# Patient Record
Sex: FEMALE | Race: BLACK OR AFRICAN AMERICAN | NOT HISPANIC OR LATINO | Employment: UNEMPLOYED | URBAN - METROPOLITAN AREA
[De-identification: names, ages, dates, MRNs, and addresses within clinical notes are randomized per-mention and may not be internally consistent; named-entity substitution may affect disease eponyms.]

---

## 2017-06-15 ENCOUNTER — HISTORICAL (OUTPATIENT)
Dept: RADIOLOGY | Facility: HOSPITAL | Age: 37
End: 2017-06-15

## 2019-01-31 ENCOUNTER — HISTORICAL (OUTPATIENT)
Dept: ADMINISTRATIVE | Facility: HOSPITAL | Age: 39
End: 2019-01-31

## 2019-02-21 ENCOUNTER — HISTORICAL (OUTPATIENT)
Dept: ADMINISTRATIVE | Facility: HOSPITAL | Age: 39
End: 2019-02-21

## 2019-09-19 ENCOUNTER — HISTORICAL (OUTPATIENT)
Dept: ADMINISTRATIVE | Facility: HOSPITAL | Age: 39
End: 2019-09-19

## 2019-09-27 ENCOUNTER — HISTORICAL (OUTPATIENT)
Dept: RADIOLOGY | Facility: HOSPITAL | Age: 39
End: 2019-09-27

## 2022-04-07 ENCOUNTER — HISTORICAL (OUTPATIENT)
Dept: ADMINISTRATIVE | Facility: HOSPITAL | Age: 42
End: 2022-04-07
Payer: MEDICAID

## 2022-04-24 VITALS
HEIGHT: 64 IN | WEIGHT: 150 LBS | SYSTOLIC BLOOD PRESSURE: 135 MMHG | BODY MASS INDEX: 25.61 KG/M2 | DIASTOLIC BLOOD PRESSURE: 84 MMHG

## 2022-06-06 ENCOUNTER — HOSPITAL ENCOUNTER (EMERGENCY)
Facility: HOSPITAL | Age: 42
Discharge: HOME | End: 2022-06-06
Attending: INTERNAL MEDICINE
Payer: MEDICAID

## 2022-06-06 VITALS
DIASTOLIC BLOOD PRESSURE: 98 MMHG | OXYGEN SATURATION: 99 % | RESPIRATION RATE: 16 BRPM | HEIGHT: 64 IN | SYSTOLIC BLOOD PRESSURE: 133 MMHG | WEIGHT: 130 LBS | BODY MASS INDEX: 22.2 KG/M2 | TEMPERATURE: 99 F | HEART RATE: 137 BPM

## 2022-06-06 DIAGNOSIS — Z00.8 ENCOUNTER FOR GENERAL PSYCHIATRIC EXAMINATION WITHOUT NEED FOR CARE: ICD-10-CM

## 2022-06-06 DIAGNOSIS — Z71.1 FEARED CONDITION NOT DEMONSTRATED: Primary | ICD-10-CM

## 2022-06-06 PROCEDURE — 99283 EMERGENCY DEPT VISIT LOW MDM: CPT

## 2022-06-06 NOTE — ED NOTES
Pt to ED via EMS for evaluation. Pt took off walking from house today because she felt she needed a walk, was tired of sitting in house. Pt admits to taking subutex and adderall. Pt denies HI/SI, denies auditory or visual hallucinations. Pt has flat affect, moves all extremities, no distress, all safety and personal needs addressed. Pt states her daughter will come pick her up if needed.

## 2022-06-06 NOTE — ED PROVIDER NOTES
Encounter Date: 6/6/2022       History     Chief Complaint   Patient presents with    medical evalution     Brought per [facility] due to family being concerned that she took off walking foe 20 minutes     41-year-old black female brought in to the ER via EMS after they were dispatched due to family concerns a bizarre behavior.  Patient states she just went for a walk after taking a Suboxone and Adderall and felt like she just needed to get out of the house.  She denies suicidal ideation she denies homicidal ideation she denies auditory or visual hallucinations        Review of patient's allergies indicates:   Allergen Reactions    Cyclobenzaprine Other (See Comments)     No past medical history on file.  No past surgical history on file.  No family history on file.  Social History     Tobacco Use    Smoking status: Former Smoker    Smokeless tobacco: Never Used     Review of Systems   Constitutional: Negative.  Negative for activity change, appetite change, chills, diaphoresis, fatigue, fever and unexpected weight change.   HENT: Negative.  Negative for congestion, dental problem, drooling, ear discharge, ear pain, facial swelling, hearing loss, mouth sores, nosebleeds, postnasal drip, rhinorrhea, sinus pressure, sinus pain, sneezing, sore throat, tinnitus, trouble swallowing and voice change.    Eyes: Negative.  Negative for photophobia, pain, discharge, redness, itching and visual disturbance.   Respiratory: Negative.  Negative for apnea, cough, choking, chest tightness, shortness of breath, wheezing and stridor.    Cardiovascular: Negative.  Negative for chest pain, palpitations and leg swelling.   Gastrointestinal: Negative.  Negative for abdominal distention, abdominal pain, anal bleeding, blood in stool, constipation, diarrhea, nausea, rectal pain and vomiting.   Endocrine: Negative.  Negative for cold intolerance, heat intolerance, polydipsia, polyphagia and polyuria.   Genitourinary: Negative.  Negative for  decreased urine volume, difficulty urinating, dyspareunia, dysuria, enuresis, flank pain, frequency, genital sores, hematuria, menstrual problem, pelvic pain, urgency, vaginal bleeding, vaginal discharge and vaginal pain.   Musculoskeletal: Negative.  Negative for arthralgias, back pain, gait problem, joint swelling, myalgias, neck pain and neck stiffness.   Skin: Negative.  Negative for color change, pallor, rash and wound.   Allergic/Immunologic: Negative.  Negative for environmental allergies, food allergies and immunocompromised state.   Neurological: Negative.  Negative for dizziness, tremors, seizures, syncope, facial asymmetry, speech difficulty, weakness, light-headedness, numbness and headaches.   Hematological: Negative.  Negative for adenopathy. Does not bruise/bleed easily.   Psychiatric/Behavioral: Negative.  Negative for agitation, behavioral problems, confusion, decreased concentration, dysphoric mood, hallucinations, self-injury, sleep disturbance and suicidal ideas. The patient is not nervous/anxious and is not hyperactive.    All other systems reviewed and are negative.      Physical Exam     Initial Vitals [06/06/22 1744]   BP Pulse Resp Temp SpO2   (!) 133/98 (!) 137 16 98.8 °F (37.1 °C) 99 %      MAP       --         Physical Exam    Nursing note and vitals reviewed.  Constitutional: She appears well-developed and well-nourished. She is not diaphoretic. No distress.   HENT:   Head: Normocephalic and atraumatic.   Mouth/Throat: Oropharynx is clear and moist. No oropharyngeal exudate.   Eyes: Conjunctivae and EOM are normal. Pupils are equal, round, and reactive to light. No scleral icterus.   Neck: Neck supple. No JVD present.   Normal range of motion.  Cardiovascular: Normal rate, regular rhythm, normal heart sounds and intact distal pulses. Exam reveals no gallop and no friction rub.    No murmur heard.  Pulmonary/Chest: Breath sounds normal. No respiratory distress. She has no wheezes. She  exhibits no tenderness.   Abdominal: Abdomen is soft. Bowel sounds are normal. She exhibits no distension. There is no abdominal tenderness. There is no rebound.   Musculoskeletal:         General: Normal range of motion.      Cervical back: Normal range of motion and neck supple.     Neurological: She is alert and oriented to person, place, and time. She has normal strength and normal reflexes.   Skin: Skin is warm and dry. Capillary refill takes less than 2 seconds.   Psychiatric: She has a normal mood and affect. Her behavior is normal. Judgment and thought content normal.         ED Course   Procedures  Labs Reviewed - No data to display       Imaging Results    None          Medications - No data to display                       Clinical Impression:   Final diagnoses:  [Z00.8] Encounter for general psychiatric examination without need for care  [Z71.1] Feared condition not demonstrated (Primary)          ED Disposition Condition    Discharge Stable        ED Prescriptions     None        Follow-up Information     Follow up With Specialties Details Why Contact Info    Primary care physician  In 3 days            Melba Wade is a certified MA and was present during the entire interaction with this patient     Suhas Ratliff MD  06/06/22 9858

## 2022-06-07 ENCOUNTER — HOSPITAL ENCOUNTER (EMERGENCY)
Facility: HOSPITAL | Age: 42
Discharge: HOME | End: 2022-06-07
Attending: STUDENT IN AN ORGANIZED HEALTH CARE EDUCATION/TRAINING PROGRAM
Payer: MEDICAID

## 2022-06-07 VITALS
RESPIRATION RATE: 18 BRPM | HEIGHT: 64 IN | BODY MASS INDEX: 22.2 KG/M2 | TEMPERATURE: 99 F | DIASTOLIC BLOOD PRESSURE: 78 MMHG | SYSTOLIC BLOOD PRESSURE: 122 MMHG | HEART RATE: 79 BPM | OXYGEN SATURATION: 100 % | WEIGHT: 130 LBS

## 2022-06-07 DIAGNOSIS — R44.0 AUDITORY HALLUCINATIONS: Primary | ICD-10-CM

## 2022-06-07 DIAGNOSIS — G47.00 INSOMNIA, UNSPECIFIED TYPE: ICD-10-CM

## 2022-06-07 PROCEDURE — 99283 EMERGENCY DEPT VISIT LOW MDM: CPT

## 2022-06-07 RX ORDER — LEVOTHYROXINE SODIUM 50 UG/1
50 TABLET ORAL DAILY
Status: ON HOLD | COMMUNITY
Start: 2022-05-26 | End: 2023-09-14 | Stop reason: HOSPADM

## 2022-06-09 NOTE — ED PROVIDER NOTES
Encounter Date: 6/7/2022       History     Chief Complaint   Patient presents with    Psychiatric Evaluation     Pt brought in by [facility] for psych eval.  Pt and family states she has been off of her meds.  Hx of schizophrenia.  Denies HI or SI.  Pt does confirm auditory hallucinations.      41-year-old female presents to ED for psychiatric evaluation.  Patient reports intermittent compliance with her medication.  States she does intermittently hear things however they are non command.  adamantly denies any suicidal or homicidal ideations.  No visual hallucinations.  No attempt at self-harm.  States she has not been sleeping well recently.  Recently in another ER and discharged. patient is here with her fiance.  Patient and  are adamantly against any admission/transfer.  Requesting therapy options as outpatient basis.  Patient has not seen her therapist recently.  Adamantly denying any drug or alcohol abuse.   is comfortable taking her home. Is not requesting any medications at this time.  No other complaints or concerns.        Review of patient's allergies indicates:   Allergen Reactions    Cyclobenzaprine Other (See Comments)     No past medical history on file.  No past surgical history on file.  No family history on file.  Social History     Tobacco Use    Smoking status: Former Smoker    Smokeless tobacco: Never Used     Review of Systems   Constitutional: Negative for chills, diaphoresis and fever.   HENT: Negative for congestion, rhinorrhea, sinus pain and sore throat.    Eyes: Negative for pain, discharge and itching.   Respiratory: Negative for cough, chest tightness and shortness of breath.    Cardiovascular: Negative for chest pain and palpitations.   Gastrointestinal: Negative for abdominal pain, nausea and vomiting.   Genitourinary: Negative for dysuria, flank pain and hematuria.   Musculoskeletal: Negative for back pain and myalgias.   Skin: Negative for color change and rash.    Neurological: Negative for dizziness, weakness and headaches.   Psychiatric/Behavioral: Positive for hallucinations and sleep disturbance. Negative for agitation, behavioral problems, confusion, decreased concentration, dysphoric mood, self-injury and suicidal ideas. The patient is not nervous/anxious and is not hyperactive.        Physical Exam     Initial Vitals [06/07/22 0936]   BP Pulse Resp Temp SpO2   125/88 82 18 97.7 °F (36.5 °C) 100 %      MAP       --         Physical Exam    Vitals reviewed.  Constitutional: She appears well-developed and well-nourished. She is not diaphoretic. No distress.   HENT:   Head: Normocephalic and atraumatic.   Eyes: Conjunctivae and EOM are normal. Pupils are equal, round, and reactive to light.   Neck: Neck supple. No tracheal deviation present.   Normal range of motion.  Cardiovascular: Normal rate, regular rhythm, normal heart sounds and intact distal pulses.   Pulmonary/Chest: Breath sounds normal. No respiratory distress.   Abdominal: Abdomen is soft. There is no abdominal tenderness. There is no rebound and no guarding.   Musculoskeletal:         General: Normal range of motion.      Cervical back: Normal range of motion and neck supple.     Neurological: She is alert and oriented to person, place, and time. She has normal strength. GCS score is 15. GCS eye subscore is 4. GCS verbal subscore is 5. GCS motor subscore is 6.   Skin: Skin is warm and dry. Capillary refill takes less than 2 seconds. No rash noted.   Psychiatric: She has a normal mood and affect. Her speech is normal and behavior is normal. Judgment and thought content normal. Her mood appears not anxious. Her affect is not angry, not blunt, not labile and not inappropriate. She is not actively hallucinating. Thought content is not paranoid and not delusional. Cognition and memory are normal. She does not express impulsivity or inappropriate judgment. She does not exhibit a depressed mood. She expresses no  homicidal and no suicidal ideation. She expresses no suicidal plans and no homicidal plans.   Flat affect          ED Course   Procedures  Labs Reviewed - No data to display       Imaging Results    None          Medications - No data to display  Medical Decision Making:   ED Management:  Had extensive bedside conversation with patient and petrae separately.  Patient has flat affect PE benign and otherwise no active hallucinations.  Also never any homicidal or suicidal ideations.  Patient and significant are declining any labs, transfer or workup at that time.  Are requesting outpatient options.  Patient at this time is not a danger to herself or others.  Does not require PEC.  Stable for outpatient therapy and provided facility names/information and very strict return precautions.  Stable for discharge at this time. ([email or web address])                      Clinical Impression:   Final diagnoses:  [R44.0] Auditory hallucinations (Primary)  [G47.00] Insomnia, unspecified type          ED Disposition Condition    Discharge Stable        ED Prescriptions     None        Follow-up Information     Follow up With Specialties Details Why Contact Info    Agustin Anne III, APRN-CNP General Practice Go to  As needed [address] [address] [phone]      [facility] Emergency Medicine  As needed, If symptoms worsen [address] [address] [phone]    [facility]  Go in 1 day             Dillon Moreno MD  06/16/22 5404

## 2023-09-08 ENCOUNTER — HOSPITAL ENCOUNTER (EMERGENCY)
Facility: HOSPITAL | Age: 43
Discharge: PSYCHIATRIC OR SUBSTANCE USE FACILITY | End: 2023-09-09
Attending: STUDENT IN AN ORGANIZED HEALTH CARE EDUCATION/TRAINING PROGRAM
Payer: MEDICAID

## 2023-09-08 VITALS
RESPIRATION RATE: 16 BRPM | SYSTOLIC BLOOD PRESSURE: 128 MMHG | TEMPERATURE: 98 F | DIASTOLIC BLOOD PRESSURE: 68 MMHG | OXYGEN SATURATION: 98 % | WEIGHT: 141.13 LBS | BODY MASS INDEX: 22.77 KG/M2 | HEART RATE: 73 BPM

## 2023-09-08 DIAGNOSIS — Z00.8 MEDICAL CLEARANCE FOR PSYCHIATRIC ADMISSION: Primary | ICD-10-CM

## 2023-09-08 DIAGNOSIS — F23 ACUTE PSYCHOSIS: ICD-10-CM

## 2023-09-08 LAB
ALBUMIN SERPL-MCNC: 4.6 G/DL (ref 3.5–5)
ALBUMIN/GLOB SERPL: 1 RATIO (ref 1.1–2)
ALP SERPL-CCNC: 81 UNIT/L (ref 40–150)
ALT SERPL-CCNC: 13 UNIT/L (ref 0–55)
AMPHET UR QL SCN: POSITIVE
APAP SERPL-MCNC: <17.4 UG/ML (ref 17.4–30)
APPEARANCE UR: ABNORMAL
AST SERPL-CCNC: 18 UNIT/L (ref 5–34)
B-HCG UR QL: NEGATIVE
BACTERIA #/AREA URNS AUTO: ABNORMAL /HPF
BARBITURATE SCN PRESENT UR: NEGATIVE
BASOPHILS # BLD AUTO: 0.05 X10(3)/MCL
BASOPHILS NFR BLD AUTO: 0.3 %
BENZODIAZ UR QL SCN: NEGATIVE
BILIRUB SERPL-MCNC: 0.6 MG/DL
BILIRUB UR QL STRIP.AUTO: NEGATIVE
BUN SERPL-MCNC: 8.4 MG/DL (ref 7–18.7)
CALCIUM SERPL-MCNC: 10.3 MG/DL (ref 8.4–10.2)
CANNABINOIDS UR QL SCN: NEGATIVE
CHLORIDE SERPL-SCNC: 103 MMOL/L (ref 98–107)
CO2 SERPL-SCNC: 25 MMOL/L (ref 22–29)
COCAINE UR QL SCN: NEGATIVE
COLOR UR: YELLOW
CREAT SERPL-MCNC: 1.01 MG/DL (ref 0.55–1.02)
CTP QC/QA: YES
EOSINOPHIL # BLD AUTO: 0.07 X10(3)/MCL (ref 0–0.9)
EOSINOPHIL NFR BLD AUTO: 0.5 %
ERYTHROCYTE [DISTWIDTH] IN BLOOD BY AUTOMATED COUNT: 12.4 % (ref 11.5–17)
ETHANOL SERPL-MCNC: <10 MG/DL
FENTANYL UR QL SCN: NEGATIVE
GFR SERPLBLD CREATININE-BSD FMLA CKD-EPI: >60 MLS/MIN/1.73/M2
GLOBULIN SER-MCNC: 4.7 GM/DL (ref 2.4–3.5)
GLUCOSE SERPL-MCNC: 88 MG/DL (ref 74–100)
GLUCOSE UR QL STRIP.AUTO: NORMAL
HCT VFR BLD AUTO: 42.8 % (ref 37–47)
HGB BLD-MCNC: 13.9 G/DL (ref 12–16)
HYALINE CASTS #/AREA URNS LPF: ABNORMAL /LPF
IMM GRANULOCYTES # BLD AUTO: 0.04 X10(3)/MCL (ref 0–0.04)
IMM GRANULOCYTES NFR BLD AUTO: 0.3 %
KETONES UR QL STRIP.AUTO: ABNORMAL
LEUKOCYTE ESTERASE UR QL STRIP.AUTO: 500
LYMPHOCYTES # BLD AUTO: 2.66 X10(3)/MCL (ref 0.6–4.6)
LYMPHOCYTES NFR BLD AUTO: 17.1 %
MCH RBC QN AUTO: 27.8 PG (ref 27–31)
MCHC RBC AUTO-ENTMCNC: 32.5 G/DL (ref 33–36)
MCV RBC AUTO: 85.6 FL (ref 80–94)
MDMA UR QL SCN: NEGATIVE
MONOCYTES # BLD AUTO: 0.87 X10(3)/MCL (ref 0.1–1.3)
MONOCYTES NFR BLD AUTO: 5.6 %
MUCOUS THREADS URNS QL MICRO: ABNORMAL /LPF
NEUTROPHILS # BLD AUTO: 11.83 X10(3)/MCL (ref 2.1–9.2)
NEUTROPHILS NFR BLD AUTO: 76.2 %
NITRITE UR QL STRIP.AUTO: NEGATIVE
NRBC BLD AUTO-RTO: 0 %
OPIATES UR QL SCN: NEGATIVE
PCP UR QL: NEGATIVE
PH UR STRIP.AUTO: 6 [PH]
PH UR: 6 [PH] (ref 3–11)
PLATELET # BLD AUTO: 272 X10(3)/MCL (ref 130–400)
PMV BLD AUTO: 12.2 FL (ref 7.4–10.4)
POTASSIUM SERPL-SCNC: 3.3 MMOL/L (ref 3.5–5.1)
PROT SERPL-MCNC: 9.3 GM/DL (ref 6.4–8.3)
PROT UR QL STRIP.AUTO: ABNORMAL
RBC # BLD AUTO: 5 X10(6)/MCL (ref 4.2–5.4)
RBC #/AREA URNS AUTO: ABNORMAL /HPF
RBC UR QL AUTO: ABNORMAL
SARS-COV-2 RDRP RESP QL NAA+PROBE: NEGATIVE
SODIUM SERPL-SCNC: 139 MMOL/L (ref 136–145)
SP GR UR STRIP.AUTO: 1.02 (ref 1–1.03)
SQUAMOUS #/AREA URNS LPF: ABNORMAL /HPF
TSH SERPL-ACNC: 2.54 UIU/ML (ref 0.35–4.94)
UROBILINOGEN UR STRIP-ACNC: NORMAL
WBC # SPEC AUTO: 15.52 X10(3)/MCL (ref 4.5–11.5)
WBC #/AREA URNS AUTO: >100 /HPF

## 2023-09-08 PROCEDURE — 87088 URINE BACTERIA CULTURE: CPT | Performed by: STUDENT IN AN ORGANIZED HEALTH CARE EDUCATION/TRAINING PROGRAM

## 2023-09-08 PROCEDURE — 81001 URINALYSIS AUTO W/SCOPE: CPT | Performed by: STUDENT IN AN ORGANIZED HEALTH CARE EDUCATION/TRAINING PROGRAM

## 2023-09-08 PROCEDURE — 96372 THER/PROPH/DIAG INJ SC/IM: CPT | Performed by: STUDENT IN AN ORGANIZED HEALTH CARE EDUCATION/TRAINING PROGRAM

## 2023-09-08 PROCEDURE — 85025 COMPLETE CBC W/AUTO DIFF WBC: CPT | Performed by: STUDENT IN AN ORGANIZED HEALTH CARE EDUCATION/TRAINING PROGRAM

## 2023-09-08 PROCEDURE — 80307 DRUG TEST PRSMV CHEM ANLYZR: CPT | Performed by: STUDENT IN AN ORGANIZED HEALTH CARE EDUCATION/TRAINING PROGRAM

## 2023-09-08 PROCEDURE — 63600175 PHARM REV CODE 636 W HCPCS: Performed by: STUDENT IN AN ORGANIZED HEALTH CARE EDUCATION/TRAINING PROGRAM

## 2023-09-08 PROCEDURE — 99285 EMERGENCY DEPT VISIT HI MDM: CPT

## 2023-09-08 PROCEDURE — 80143 DRUG ASSAY ACETAMINOPHEN: CPT | Performed by: STUDENT IN AN ORGANIZED HEALTH CARE EDUCATION/TRAINING PROGRAM

## 2023-09-08 PROCEDURE — 84443 ASSAY THYROID STIM HORMONE: CPT | Performed by: STUDENT IN AN ORGANIZED HEALTH CARE EDUCATION/TRAINING PROGRAM

## 2023-09-08 PROCEDURE — 81025 URINE PREGNANCY TEST: CPT | Performed by: STUDENT IN AN ORGANIZED HEALTH CARE EDUCATION/TRAINING PROGRAM

## 2023-09-08 PROCEDURE — 80053 COMPREHEN METABOLIC PANEL: CPT | Performed by: STUDENT IN AN ORGANIZED HEALTH CARE EDUCATION/TRAINING PROGRAM

## 2023-09-08 PROCEDURE — 87186 SC STD MICRODIL/AGAR DIL: CPT | Performed by: STUDENT IN AN ORGANIZED HEALTH CARE EDUCATION/TRAINING PROGRAM

## 2023-09-08 PROCEDURE — 87635 SARS-COV-2 COVID-19 AMP PRB: CPT | Performed by: STUDENT IN AN ORGANIZED HEALTH CARE EDUCATION/TRAINING PROGRAM

## 2023-09-08 PROCEDURE — 82077 ASSAY SPEC XCP UR&BREATH IA: CPT | Performed by: STUDENT IN AN ORGANIZED HEALTH CARE EDUCATION/TRAINING PROGRAM

## 2023-09-08 RX ORDER — GENTAMICIN SULFATE 40 MG/ML
5 INJECTION, SOLUTION INTRAMUSCULAR; INTRAVENOUS ONCE
Status: COMPLETED | OUTPATIENT
Start: 2023-09-09 | End: 2023-09-08

## 2023-09-08 RX ORDER — GENTAMICIN SULFATE 40 MG/ML
5 INJECTION, SOLUTION INTRAMUSCULAR; INTRAVENOUS ONCE
Status: DISCONTINUED | OUTPATIENT
Start: 2023-09-08 | End: 2023-09-08

## 2023-09-08 RX ORDER — HALOPERIDOL 5 MG/ML
5 INJECTION INTRAMUSCULAR
Status: COMPLETED | OUTPATIENT
Start: 2023-09-08 | End: 2023-09-08

## 2023-09-08 RX ADMIN — GENTAMICIN SULFATE 320 MG: 40 INJECTION, SOLUTION INTRAMUSCULAR; INTRAVENOUS at 11:09

## 2023-09-08 RX ADMIN — HALOPERIDOL LACTATE 5 MG: 5 INJECTION, SOLUTION INTRAMUSCULAR at 05:09

## 2023-09-08 NOTE — ED PROVIDER NOTES
Encounter Date: 9/8/2023       History     Chief Complaint   Patient presents with    Psychiatric Evaluation     Brought in by police on [facility].  PT STATES SHE IS FINE AND DOES NOT NEED ANY HELP.  DENIES SI/HI -AH-VH.  WANDED.      Patient presents to the emergency department by [facility] for psychotic behavior.  Has a history of schizophrenia per [facility] in his off her meds.  On my evaluation the patient was sitting calmly, but states that she was fine.  When I asked her different questions she just keeps on repeating that she was fine.  When asked her why she was here she just states she was worried about her children.  She does shake her head no when asked if she was suicidal or homicidal but does have a bizarre affect.  I discussed the patient over the phone with the daughter had follow up with the [facility], she states patient has been recently hallucinating and paranoid she was afraid people are out to kill her.    The history is provided by the patient and a relative ([facility]). The history is limited by the condition of the patient.     Review of patient's allergies indicates:   Allergen Reactions    Cyclobenzaprine Other (See Comments)     History reviewed. No pertinent past medical history.  History reviewed. No pertinent surgical history.  History reviewed. No pertinent family history.  Social History     Tobacco Use    Smoking status: Former    Smokeless tobacco: Never   Substance Use Topics    Drug use: Not Currently     Review of Systems   Unable to perform ROS: Psychiatric disorder       Physical Exam     Initial Vitals [09/08/23 1656]   BP Pulse Resp Temp SpO2   (!) 149/96 (!) 119 16 97.9 °F (36.6 °C) 100 %      MAP       --         Physical Exam    Nursing note and vitals reviewed.  Constitutional: She appears well-developed and well-nourished.   HENT:   Head: Normocephalic and atraumatic.   Eyes: EOM are normal. Pupils are equal, round, and reactive to light.   Neck: Neck supple.   Normal range of motion.  Cardiovascular:   Normal rate and regular rhythm.           Pulmonary/Chest: Breath sounds normal. No respiratory distress.   Abdominal: Abdomen is soft. There is no abdominal tenderness.   Musculoskeletal:         General: No edema. Normal range of motion.      Cervical back: Normal range of motion and neck supple.     Neurological: She is alert and oriented to person, place, and time.   Skin: Skin is warm and dry.         ED Course   Procedures  Labs Reviewed   COMPREHENSIVE METABOLIC PANEL - Abnormal; Notable for the following components:       Result Value    Potassium Level 3.3 (*)     Calcium Level Total 10.3 (*)     Protein Total 9.3 (*)     Globulin 4.7 (*)     Albumin/Globulin Ratio 1.0 (*)     All other components within normal limits   ACETAMINOPHEN LEVEL - Abnormal; Notable for the following components:    Acetaminophen Level <17.4 (*)     All other components within normal limits   CBC WITH DIFFERENTIAL - Abnormal; Notable for the following components:    WBC 15.52 (*)     MCHC 32.5 (*)     MPV 12.2 (*)     Neut # 11.83 (*)     All other components within normal limits   ALCOHOL,MEDICAL (ETHANOL) - Normal   SARS-COV-2 RNA AMPLIFICATION, QUAL - Normal    Narrative:     The IDNOW COVID-19 assay is a rapid molecular in vitro diagnostic test utilizing an isothermal nucleic acid amplification technology intended for the qualitative detection of nucleic acid from the SARS-CoV-2 viral RNA in direct nasal, nasopharyngeal or throat swabs from individuals who are suspected of COVID-19 by their healthcare provider.   CBC W/ AUTO DIFFERENTIAL    Narrative:     The following orders were created for panel order CBC auto differential.  Procedure                               Abnormality         Status                     ---------                               -----------         ------                     CBC with Differential[[id number]]       Abnormal            Final result                 Please view results for these tests on  the individual orders.   TSH   URINALYSIS, REFLEX TO URINE CULTURE   DRUG SCREEN, URINE (BEAKER)   EXTRA TUBES    Narrative:     The following orders were created for panel order EXTRA TUBES.  Procedure                               Abnormality         Status                     ---------                               -----------         ------                     Red Top Hold[[id number]]                                    In process                   Please view results for these tests on the individual orders.   RED TOP HOLD   POCT URINE PREGNANCY          Imaging Results    None          Medications   haloperidol lactate injection 5 mg (5 mg Intramuscular Given 9/8/23 1752)     Medical Decision Making  Pec was placed.  Labs show an elevated white count of 15 the patient was afebrile and no obvious evidence of infection on examination.  CMP is generally unremarkable, ethanol and Tylenol levels are negative.  Patient was medically stable for psychiatric admission.    Amount and/or Complexity of Data Reviewed  Labs: ordered. Decision-making details documented in ED Course.    Risk  Prescription drug management.                               Clinical Impression:   Final diagnoses:  [Z00.8] Medical clearance for psychiatric admission (Primary)  [F23] Acute psychosis        ED Disposition Condition    Transfer to Psych Facility Stable          ED Prescriptions    None       Follow-up Information    None          Mason Mcbride MD  09/08/23 4718

## 2023-09-09 ENCOUNTER — HOSPITAL ENCOUNTER (INPATIENT)
Facility: HOSPITAL | Age: 43
LOS: 6 days | Discharge: HOME | DRG: 881 | End: 2023-09-15
Attending: PSYCHIATRY & NEUROLOGY | Admitting: PSYCHIATRY & NEUROLOGY
Payer: MEDICAID

## 2023-09-09 DIAGNOSIS — F29 PSYCHOSIS: ICD-10-CM

## 2023-09-09 PROCEDURE — 25000003 PHARM REV CODE 250: Performed by: PEDIATRICS

## 2023-09-09 PROCEDURE — 96372 THER/PROPH/DIAG INJ SC/IM: CPT | Performed by: STUDENT IN AN ORGANIZED HEALTH CARE EDUCATION/TRAINING PROGRAM

## 2023-09-09 PROCEDURE — 25000003 PHARM REV CODE 250: Performed by: NURSE PRACTITIONER

## 2023-09-09 PROCEDURE — 11400000 HC PSYCH PRIVATE ROOM

## 2023-09-09 PROCEDURE — 63600175 PHARM REV CODE 636 W HCPCS: Performed by: STUDENT IN AN ORGANIZED HEALTH CARE EDUCATION/TRAINING PROGRAM

## 2023-09-09 RX ORDER — NITROFURANTOIN 25; 75 MG/1; MG/1
100 CAPSULE ORAL EVERY 12 HOURS
Status: DISCONTINUED | OUTPATIENT
Start: 2023-09-09 | End: 2023-09-15 | Stop reason: HOSPADM

## 2023-09-09 RX ORDER — DIPHENHYDRAMINE HCL 50 MG
50 CAPSULE ORAL EVERY 6 HOURS PRN
Status: DISCONTINUED | OUTPATIENT
Start: 2023-09-09 | End: 2023-09-15 | Stop reason: HOSPADM

## 2023-09-09 RX ORDER — HYDROXYZINE HYDROCHLORIDE 50 MG/1
50 TABLET, FILM COATED ORAL EVERY 6 HOURS PRN
Status: DISCONTINUED | OUTPATIENT
Start: 2023-09-09 | End: 2023-09-15 | Stop reason: HOSPADM

## 2023-09-09 RX ORDER — HALOPERIDOL 5 MG/1
10 TABLET ORAL EVERY 4 HOURS PRN
Status: DISCONTINUED | OUTPATIENT
Start: 2023-09-09 | End: 2023-09-15 | Stop reason: HOSPADM

## 2023-09-09 RX ORDER — TRAZODONE HYDROCHLORIDE 100 MG/1
100 TABLET ORAL NIGHTLY PRN
Status: DISCONTINUED | OUTPATIENT
Start: 2023-09-09 | End: 2023-09-15 | Stop reason: HOSPADM

## 2023-09-09 RX ORDER — BUSPIRONE HYDROCHLORIDE 5 MG/1
10 TABLET ORAL 2 TIMES DAILY
Status: DISCONTINUED | OUTPATIENT
Start: 2023-09-09 | End: 2023-09-15 | Stop reason: HOSPADM

## 2023-09-09 RX ORDER — HALOPERIDOL 5 MG/ML
10 INJECTION INTRAMUSCULAR EVERY 6 HOURS PRN
Status: DISCONTINUED | OUTPATIENT
Start: 2023-09-09 | End: 2023-09-15 | Stop reason: HOSPADM

## 2023-09-09 RX ORDER — ADHESIVE BANDAGE
30 BANDAGE TOPICAL DAILY PRN
Status: DISCONTINUED | OUTPATIENT
Start: 2023-09-09 | End: 2023-09-15 | Stop reason: HOSPADM

## 2023-09-09 RX ORDER — DIPHENHYDRAMINE HYDROCHLORIDE 50 MG/ML
50 INJECTION INTRAMUSCULAR; INTRAVENOUS EVERY 6 HOURS PRN
Status: DISCONTINUED | OUTPATIENT
Start: 2023-09-09 | End: 2023-09-15 | Stop reason: HOSPADM

## 2023-09-09 RX ORDER — LORAZEPAM 1 MG/1
2 TABLET ORAL EVERY 6 HOURS PRN
Status: DISCONTINUED | OUTPATIENT
Start: 2023-09-09 | End: 2023-09-15 | Stop reason: HOSPADM

## 2023-09-09 RX ORDER — LEVOTHYROXINE SODIUM 50 UG/1
50 TABLET ORAL DAILY
Status: DISCONTINUED | OUTPATIENT
Start: 2023-09-09 | End: 2023-09-15 | Stop reason: HOSPADM

## 2023-09-09 RX ORDER — MUPIROCIN 20 MG/G
OINTMENT TOPICAL 2 TIMES DAILY
Status: DISCONTINUED | OUTPATIENT
Start: 2023-09-09 | End: 2023-09-13

## 2023-09-09 RX ORDER — LORAZEPAM 2 MG/ML
2 INJECTION INTRAMUSCULAR EVERY 6 HOURS PRN
Status: DISCONTINUED | OUTPATIENT
Start: 2023-09-09 | End: 2023-09-15 | Stop reason: HOSPADM

## 2023-09-09 RX ADMIN — BUSPIRONE HYDROCHLORIDE 10 MG: 5 TABLET ORAL at 08:09

## 2023-09-09 RX ADMIN — NITROFURANTOIN MONOHYDRATE/MACROCRYSTALS 100 MG: 25; 75 CAPSULE ORAL at 11:09

## 2023-09-09 RX ADMIN — NITROFURANTOIN MONOHYDRATE/MACROCRYSTALS 100 MG: 25; 75 CAPSULE ORAL at 08:09

## 2023-09-09 NOTE — H&P
"9/9/2023 2:55 PM   Elsa Chery   1980   [id number]       Initial Psychiatric Consult    Chief complaint: "I'm ready to go home."     SUBJECTIVE:   HPI:   Elsa Chery is a 43 y.o. female with past psychiatric history of opiate use disorder per her hx but records indicate hx of schizophrenia, currently admitted with depression.  Psychiatry has been consulted to address mood.    Ms. Chery reports that she does not understand why she is here.  She denies depression and she denies anger.  She denies an elevated mood.  She denies AVH and delusional thinking currently.  She says that the only thing that she had been experiencing was that she had been thinking about family members and worrying about them.  She reports that she just finds herself thinking about them and worrying about them but "nothing has really been going one."  She adamantly denies AVH and delusional thinking.  She denies weird behaviors.  She reports that she has been performing her ADLs without any issues.  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  She says that she does not have her own transportation and has to rely on others to get her to work.  She denies SI or HI.  She reports a good appetite and states that she has been sleeping well.    Collateral:   Reviewed records from the hospital.    Past Psychiatric History:   Previous Psychiatric Hospitalizations: She reports that she has gone to the [facility] hospital a couple times in the past for hearing voices.  She says that she was not using drugs.   Previous Medication Trials: She is unsure.  She admits to Adderall and Subutex but she does not recall the name of her prescriber.  Previous Suicide Attempts: Denies   History of Violence: Denies   Outpatient psychiatrist: She does not recall the name.     Past Medical/Surgical History:   No past medical history on file.  Left arm surgery after she fell.  She reports no " menses because she was on Depo Provera.    Family Psychiatric History:   She denies       Current Medications:   Scheduled Meds:    levothyroxine  50 mcg Oral Daily    mupirocin   Nasal BID    nitrofurantoin (macrocrystal-monohydrate)  100 mg Oral Q12H      PRN Meds: diphenhydrAMINE, diphenhydrAMINE, haloperidol lactate, haloperidoL, hydrOXYzine HCL, lorazepam, LORazepam, magnesium hydroxide 400 mg/5 ml, traZODone   Psychotherapeutics (From admission, onward)      Start     Stop Route Frequency Ordered    09/09/23 0626  haloperidol lactate injection 10 mg         -- IM Every 6 hours PRN 09/09/23 0626    09/09/23 0626  haloperidoL tablet 10 mg         -- Oral Every 4 hours PRN 09/09/23 0626    09/09/23 0626  LORazepam injection 2 mg         -- IM Every 6 hours PRN 09/09/23 0626    09/09/23 0626  LORazepam tablet 2 mg         -- Oral Every 6 hours PRN 09/09/23 0626    09/09/23 0626  traZODone tablet 100 mg         -- Oral Nightly PRN 09/09/23 0626          OTC Meds: Denies   Home Meds: Adderall 30 mg PO BID; Subutex 8 mg TID     Allergies:   Review of patient's allergies indicates:   Allergen Reactions    Cyclobenzaprine Other (See Comments)          Substance Abuse History:   Tobacco Use:Denies   Use of Alcohol: Denies   Recreational Drugs:Reports that she used to abuse opiates for years.     Rehab History:Denies       Nerurological History:   Seizures: Denies   Head trauma: Denies     Social History:  Housing Status: Lives with daughter and grandkids.  Relationship Status/Sexual Orientation: Single; does have a boyfriend.  Children: 3  Education: [employer] in 10th grade.  No GED.   Employment Status/Info: Unemployed; lost her job a couple weeks ago at [employer]    history: Denies  History of physical/sexual abuse: Denies   Access to gun: Denies       Legal History:   Past Charges/Incarcerations:Denies   Pending charges: Denies       OBJECTIVE:   Vitals   Vitals:    09/09/23 0701   BP: 124/81   Pulse: (!)  121   Resp: 18   Temp: 97.6 °F (36.4 °C)        Labs/Imaging/Studies:   Recent Results (from the past 48 hour(s))   COVID-19 Rapid Screening    Collection Time: 09/08/23  5:32 PM   Result Value Ref Range    SARS COV-2 MOLECULAR Negative Negative   Comprehensive metabolic panel    Collection Time: 09/08/23  6:10 PM   Result Value Ref Range    Sodium Level 139 136 - 145 mmol/L    Potassium Level 3.3 (L) 3.5 - 5.1 mmol/L    Chloride 103 98 - 107 mmol/L    Carbon Dioxide 25 22 - 29 mmol/L    Glucose Level 88 74 - 100 mg/dL    Blood Urea Nitrogen 8.4 7.0 - 18.7 mg/dL    Creatinine 1.01 0.55 - 1.02 mg/dL    Calcium Level Total 10.3 (H) 8.4 - 10.2 mg/dL    Protein Total 9.3 (H) 6.4 - 8.3 gm/dL    Albumin Level 4.6 3.5 - 5.0 g/dL    Globulin 4.7 (H) 2.4 - 3.5 gm/dL    Albumin/Globulin Ratio 1.0 (L) 1.1 - 2.0 ratio    Bilirubin Total 0.6 <=1.5 mg/dL    Alkaline Phosphatase 81 40 - 150 unit/L    Alanine Aminotransferase 13 0 - 55 unit/L    Aspartate Aminotransferase 18 5 - 34 unit/L    eGFR >60 mls/min/1.73/m2   TSH    Collection Time: 09/08/23  6:10 PM   Result Value Ref Range    Thyroid Stimulating Hormone 2.542 0.350 - 4.940 uIU/mL   Ethanol    Collection Time: 09/08/23  6:10 PM   Result Value Ref Range    Ethanol Level <10.0 <=10.0 mg/dL   Acetaminophen level    Collection Time: 09/08/23  6:10 PM   Result Value Ref Range    Acetaminophen Level <17.4 (L) 17.4 - 30.0 ug/ml   CBC with Differential    Collection Time: 09/08/23  6:10 PM   Result Value Ref Range    WBC 15.52 (H) 4.50 - 11.50 x10(3)/mcL    RBC 5.00 4.20 - 5.40 x10(6)/mcL    Hgb 13.9 12.0 - 16.0 g/dL    Hct 42.8 37.0 - 47.0 %    MCV 85.6 80.0 - 94.0 fL    MCH 27.8 27.0 - 31.0 pg    MCHC 32.5 (L) 33.0 - 36.0 g/dL    RDW 12.4 11.5 - 17.0 %    Platelet 272 130 - 400 x10(3)/mcL    MPV 12.2 (H) 7.4 - 10.4 fL    Neut % 76.2 %    Lymph % 17.1 %    Mono % 5.6 %    Eos % 0.5 %    Basophil % 0.3 %    Lymph # 2.66 0.6 - 4.6 x10(3)/mcL    Neut # 11.83 (H) 2.1 - 9.2  "x10(3)/mcL    Mono # 0.87 0.1 - 1.3 x10(3)/mcL    Eos # 0.07 0 - 0.9 x10(3)/mcL    Baso # 0.05 <=0.2 x10(3)/mcL    IG# 0.04 0 - 0.04 x10(3)/mcL    IG% 0.3 %    NRBC% 0.0 %   Urinalysis, Reflex to Urine Culture    Collection Time: 09/08/23  7:31 PM    Specimen: Urine, Clean Catch   Result Value Ref Range    Color, UA Yellow Yellow, Light-Yellow, Dark Yellow, Simin, Straw    Appearance, UA Turbid (A) Clear    Specific Gravity, UA 1.020 1.005 - 1.030    pH, UA 6.0 5.0 - 8.5    Protein, UA 1+ (A) Negative    Glucose, UA Normal Negative, Normal    Ketones, UA 1+ (A) Negative    Blood, UA 3+ (A) Negative    Bilirubin, UA Negative Negative    Urobilinogen, UA Normal 0.2, 1.0, Normal    Nitrites, UA Negative Negative    Leukocyte Esterase,  (A) Negative    WBC, UA >100 (A) None Seen, 0-2, 3-5, 0-5 /HPF    Bacteria, UA Occ (A) None Seen /HPF    Squamous Epithelial Cells, UA Many (A) None Seen /HPF    Mucous, UA Few (A) None Seen /LPF    Hyaline Casts, UA None Seen None Seen /lpf    RBC, UA 11-20 (A) None Seen, 0-2, 3-5, 0-5 /HPF   Drug Screen, Urine    Collection Time: 09/08/23  7:31 PM   Result Value Ref Range    Amphetamines, Urine Positive (A) Negative    Barbituates, Urine Negative Negative    Benzodiazepine, Urine Negative Negative    Cannabinoids, Urine Negative Negative    Cocaine, Urine Negative Negative    Fentanyl, Urine Negative Negative    MDMA, Urine Negative Negative    Opiates, Urine Negative Negative    Phencyclidine, Urine Negative Negative    pH, Urine 6.0 3.0 - 11.0   POCT urine pregnancy    Collection Time: 09/08/23 10:09 PM   Result Value Ref Range    POC Preg Test, Ur Negative Negative     Acceptable Yes       No results found for: "PHENYTOIN", "PHENOBARB", "VALPROATE", "CBMZ"      Medical Review Of Systems:  A comprehensive review of systems was negative except for: Behavioral/Psych: positive for depression and paranoia, anxiety    Psychiatric Mental Status Exam:  General " Appearance: unremarkable, dressed in hospital garb  Arousal: drowsy  Behavior: cooperative, polite, guarded/minimizing what she has been experiencing  Movements and Motor Activity: no abnormal involuntary movements noted; no tics, no tremors, no akathisia, no dystonia, no evidence of tardive dyskinesia; no psychomotor agitation or retardation  Orientation: grossly intact  Speech: normal rate, rhythm, volume, tone and pitch  Mood: Anxious and Guarded  Affect: blunted  Thought Process: bizarre, +thought blocking  Associations: intact, no loosening of associations  Thought Content and Perceptions: no suicidal ideation, no homicidal ideation, no hallucinations, + suspicious  Recent and Remote Memory: no significant impairments noted  Attention and Concentration: intact  Fund of Knowledge: intact  Insight: impaired  Judgment: impaired    ASSESSMENT/PLAN:   Anxiety, NOS  Psychosis, NOS  R/O Schizophrenia    No past medical history on file.       Recommendations:   Buspar 10 mg PO BID  Brief talk therapy with Ms. Chery regarding her minimizing her emotions and how talk therapy can assist with processing emotions, moods, and thoughts.  She does not see any issues and does not understand why her family had her admitted.  Estimated LOS is 3-7 days      Matilde Dorman

## 2023-09-09 NOTE — H&P
[facility]  History & Physical    Subjective:      Chief Complaint/Reason for Admission: major depression with amphetamine abuse     Elsa Chery is a 43 y.o. female. Amphetamine abuse     No past medical history on file.  No past surgical history on file.  No family history on file.  Social History     Tobacco Use    Smoking status: Former    Smokeless tobacco: Never   Substance Use Topics    Drug use: Not Currently       PTA Medications   Medication Sig    levothyroxine (SYNTHROID) 50 MCG tablet Take 50 mcg by mouth once daily.     Review of patient's allergies indicates:   Allergen Reactions    Cyclobenzaprine Other (See Comments)        Review of Systems   Constitutional: Negative.    HENT: Negative.     Eyes: Negative.    Respiratory: Negative.     Cardiovascular: Negative.    Gastrointestinal: Negative.    Genitourinary: Negative.    Musculoskeletal: Negative.    Skin: Negative.    Neurological: Negative.    Endo/Heme/Allergies: Negative.    Psychiatric/Behavioral:  Positive for depression, hallucinations and substance abuse. Negative for suicidal ideas.        Objective:      Vital Signs (Most Recent)  Temp: 97.6 °F (36.4 °C) (09/09/23 0701)  Pulse: (!) 121 (09/09/23 0701)  Resp: 18 (09/09/23 0701)  BP: 124/81 (09/09/23 0701)  SpO2: 100 % (09/09/23 0701)    Vital Signs Range (Last 24H):  Temp:  [97.6 °F (36.4 °C)-97.9 °F (36.6 °C)]   Pulse:  []   Resp:  [16-18]   BP: (124-149)/()   SpO2:  [98 %-100 %]     Physical Exam  HENT:      Head: Normocephalic.      Right Ear: Tympanic membrane normal.      Left Ear: Tympanic membrane normal.      Nose: Nose normal.      Mouth/Throat:      Mouth: Mucous membranes are moist.   Eyes:      Extraocular Movements: Extraocular movements intact.      Pupils: Pupils are equal, round, and reactive to light.   Cardiovascular:      Rate and Rhythm: Normal rate and regular rhythm.   Pulmonary:      Effort: Pulmonary effort is  normal.   Abdominal:      General: Abdomen is flat.   Musculoskeletal:         General: Normal range of motion.   Skin:     General: Skin is warm.   Neurological:      General: No focal deficit present.      Mental Status: She is alert and oriented to person, place, and time.      Comments: Vision normal   Hearing normal   EOM intact   Face muscles normal  Facial sensation normal   Shrugs shoulders  Tongue midline            Data Review:    Recent Results (from the past 48 hour(s))   COVID-19 Rapid Screening    Collection Time: 09/08/23  5:32 PM   Result Value Ref Range    SARS COV-2 MOLECULAR Negative Negative   Comprehensive metabolic panel    Collection Time: 09/08/23  6:10 PM   Result Value Ref Range    Sodium Level 139 136 - 145 mmol/L    Potassium Level 3.3 (L) 3.5 - 5.1 mmol/L    Chloride 103 98 - 107 mmol/L    Carbon Dioxide 25 22 - 29 mmol/L    Glucose Level 88 74 - 100 mg/dL    Blood Urea Nitrogen 8.4 7.0 - 18.7 mg/dL    Creatinine 1.01 0.55 - 1.02 mg/dL    Calcium Level Total 10.3 (H) 8.4 - 10.2 mg/dL    Protein Total 9.3 (H) 6.4 - 8.3 gm/dL    Albumin Level 4.6 3.5 - 5.0 g/dL    Globulin 4.7 (H) 2.4 - 3.5 gm/dL    Albumin/Globulin Ratio 1.0 (L) 1.1 - 2.0 ratio    Bilirubin Total 0.6 <=1.5 mg/dL    Alkaline Phosphatase 81 40 - 150 unit/L    Alanine Aminotransferase 13 0 - 55 unit/L    Aspartate Aminotransferase 18 5 - 34 unit/L    eGFR >60 mls/min/1.73/m2   TSH    Collection Time: 09/08/23  6:10 PM   Result Value Ref Range    Thyroid Stimulating Hormone 2.542 0.350 - 4.940 uIU/mL   Ethanol    Collection Time: 09/08/23  6:10 PM   Result Value Ref Range    Ethanol Level <10.0 <=10.0 mg/dL   Acetaminophen level    Collection Time: 09/08/23  6:10 PM   Result Value Ref Range    Acetaminophen Level <17.4 (L) 17.4 - 30.0 ug/ml   CBC with Differential    Collection Time: 09/08/23  6:10 PM   Result Value Ref Range    WBC 15.52 (H) 4.50 - 11.50 x10(3)/mcL    RBC 5.00 4.20 - 5.40 x10(6)/mcL    Hgb 13.9 12.0 - 16.0  g/dL    Hct 42.8 37.0 - 47.0 %    MCV 85.6 80.0 - 94.0 fL    MCH 27.8 27.0 - 31.0 pg    MCHC 32.5 (L) 33.0 - 36.0 g/dL    RDW 12.4 11.5 - 17.0 %    Platelet 272 130 - 400 x10(3)/mcL    MPV 12.2 (H) 7.4 - 10.4 fL    Neut % 76.2 %    Lymph % 17.1 %    Mono % 5.6 %    Eos % 0.5 %    Basophil % 0.3 %    Lymph # 2.66 0.6 - 4.6 x10(3)/mcL    Neut # 11.83 (H) 2.1 - 9.2 x10(3)/mcL    Mono # 0.87 0.1 - 1.3 x10(3)/mcL    Eos # 0.07 0 - 0.9 x10(3)/mcL    Baso # 0.05 <=0.2 x10(3)/mcL    IG# 0.04 0 - 0.04 x10(3)/mcL    IG% 0.3 %    NRBC% 0.0 %   Urinalysis, Reflex to Urine Culture    Collection Time: 09/08/23  7:31 PM    Specimen: Urine, Clean Catch   Result Value Ref Range    Color, UA Yellow Yellow, Light-Yellow, Dark Yellow, Simin, Straw    Appearance, UA Turbid (A) Clear    Specific Gravity, UA 1.020 1.005 - 1.030    pH, UA 6.0 5.0 - 8.5    Protein, UA 1+ (A) Negative    Glucose, UA Normal Negative, Normal    Ketones, UA 1+ (A) Negative    Blood, UA 3+ (A) Negative    Bilirubin, UA Negative Negative    Urobilinogen, UA Normal 0.2, 1.0, Normal    Nitrites, UA Negative Negative    Leukocyte Esterase,  (A) Negative    WBC, UA >100 (A) None Seen, 0-2, 3-5, 0-5 /HPF    Bacteria, UA Occ (A) None Seen /HPF    Squamous Epithelial Cells, UA Many (A) None Seen /HPF    Mucous, UA Few (A) None Seen /LPF    Hyaline Casts, UA None Seen None Seen /lpf    RBC, UA 11-20 (A) None Seen, 0-2, 3-5, 0-5 /HPF   Drug Screen, Urine    Collection Time: 09/08/23  7:31 PM   Result Value Ref Range    Amphetamines, Urine Positive (A) Negative    Barbituates, Urine Negative Negative    Benzodiazepine, Urine Negative Negative    Cannabinoids, Urine Negative Negative    Cocaine, Urine Negative Negative    Fentanyl, Urine Negative Negative    MDMA, Urine Negative Negative    Opiates, Urine Negative Negative    Phencyclidine, Urine Negative Negative    pH, Urine 6.0 3.0 - 11.0   POCT urine pregnancy    Collection Time: 09/08/23 10:09 PM   Result Value  Ref Range    POC Preg Test, Ur Negative Negative     Acceptable Yes         No results found.       Assessment and Plan     Methamphetamine abuse   UTI

## 2023-09-09 NOTE — NURSING
"Daily Nursing Note:      Behavior:    Patient (Elsa Chery is a 43 y.o. female, : 1980, MRN: [id number]) demonstrating an affect that was sad, flat, anxious, irritable, and  labile. Elsa demonstrating mood that is depressed, anxious, and swings. Elsa had an appearance that was disheveled. Elsa denies suicidal ideation. Elsa denies suicide plan. Elsa denies homicidal ideation. Elsa endorses hallucinations.    Sonias  height is 5' 6" (1.676 m) and weight is 63.9 kg (140 lb 12.8 oz). Her temperature is 97.9 °F (36.6 °C). Her blood pressure is 145/100 (abnormal) and her pulse is 98. Her respiration is 18.     Sonias last BM was noted on: 23.      Intervention:    Encourage Elsa to perform self-hygiene, grooming, and changing of clothing. Monitor Elsa's behavior and program compliance. Monitor Elsa for suicidal ideation, homicidal ideation, sleep disturbance, and hallucinations. Encourage Elsa to eat all portions of meals and assess for meal preferences. Monitor Elsa for intake and output to ensure hydration. Notify the Physician/Physician Assistant/Advance Practice Registered Nurse (MD/PA/APRN) for any medication refusal and any change in patient condition.      Response:    Elsa verbalizes understand of unit process and procedures.       Plan:     Continue to monitor per MD/PA/APRN orders; maintain patient safety.    "

## 2023-09-09 NOTE — NURSING
"Admission Note:    Elsa Chery is a 43 y.o. female, : 1980, MRN: [id number], admitted on 2023 to [facility] ([facility]) for Royal Cohen MD with a diagnosis of No admission diagnoses are documented for this encounter.. Patient admitted on a status of Physician Emergency Certificate (PEC). Elsa reports the following allergies:.    Patient demonstrated an affect that was flat and anxious. Patient demonstrated mood during assessment that was anxious. Patient had an appearance that was poor hygiene.  Patient denies suicidal ideation. Patient denies suicide plan. Patient endorses hallucinations.    Sonias  height is 5' 6" (1.676 m) and weight is 63.9 kg (140 lb 12.8 oz). Her temperature is 97.9 °F (36.6 °C). Her blood pressure is 145/100 (abnormal) and her pulse is 98. Her respiration is 18.     Sonias last BM was noted on: _______    Metal detector screening performed via security personnel. The result of the scan was _______. Head-to-toe physical assessment completed with the following findings:  ________ found upon body screen. A full skin assessment was performed. Vivi skin appeared _______.  Elsa was oriented to unit, staff, peers, and room. Patient belongings/valuables stored in locked intake room cabinet and changes of clothing provided to patient. Elsa was placed on Q 15 min observations.      "

## 2023-09-10 PROCEDURE — 25000003 PHARM REV CODE 250: Performed by: PEDIATRICS

## 2023-09-10 PROCEDURE — 25000003 PHARM REV CODE 250: Performed by: NURSE PRACTITIONER

## 2023-09-10 PROCEDURE — 11400000 HC PSYCH PRIVATE ROOM

## 2023-09-10 RX ADMIN — BUSPIRONE HYDROCHLORIDE 10 MG: 5 TABLET ORAL at 08:09

## 2023-09-10 RX ADMIN — LEVOTHYROXINE SODIUM 50 MCG: 50 TABLET ORAL at 08:09

## 2023-09-10 RX ADMIN — NITROFURANTOIN MONOHYDRATE/MACROCRYSTALS 100 MG: 25; 75 CAPSULE ORAL at 08:09

## 2023-09-10 NOTE — PLAN OF CARE
Problem: Adult Inpatient Plan of Care  Goal: Plan of Care Review  Outcome: Ongoing, Progressing  Goal: Patient-Specific Goal (Individualized)  Outcome: Ongoing, Progressing  Goal: Absence of Hospital-Acquired Illness or Injury  Outcome: Ongoing, Progressing  Goal: Optimal Comfort and Wellbeing  Outcome: Ongoing, Progressing  Goal: Readiness for Transition of Care  Outcome: Ongoing, Progressing     Problem: Behavior Regulation Impairment (Psychotic Signs/Symptoms)  Goal: Improved Behavioral Control (Psychotic Signs/Symptoms)  Outcome: Ongoing, Progressing     Problem: Cognitive Impairment (Psychotic Signs/Symptoms)  Goal: Optimal Cognitive Function (Psychotic Signs/Symptoms)  Outcome: Ongoing, Progressing     Problem: Decreased Participation and Engagement (Psychotic Signs/Symptoms)  Goal: Increased Participation and Engagement (Psychotic Signs/Symptoms)  Outcome: Ongoing, Progressing     Problem: Mood Impairment (Psychotic Signs/Symptoms)  Goal: Improved Mood Symptoms (Psychotic Signs/Symptoms)  Outcome: Ongoing, Progressing     Problem: Sensory Perception Impairment (Psychotic Signs/Symptoms)  Goal: Decreased Sensory Symptoms (Psychotic Signs/Symptoms)  Outcome: Ongoing, Progressing     Problem: Sleep Disturbance (Psychotic Signs/Symptoms)  Goal: Improved Sleep (Psychotic Signs/Symptoms)  Outcome: Ongoing, Progressing     Problem: Social, Occupational or Functional Impairment (Psychotic Signs/Symptoms)  Goal: Enhanced Social, Occupational or Functional Skills (Psychotic Signs/Symptoms)  Outcome: Ongoing, Progressing     Problem: Activity and Energy Impairment (Excessive Substance Use)  Goal: Optimized Energy Level (Excessive Substance Use)  Outcome: Ongoing, Progressing     Problem: Behavior Regulation Impairment (Excessive Substance Use)  Goal: Improved Behavioral Control (Excessive Substance Use)  Outcome: Ongoing, Progressing     Problem: Decreased Participation and Engagement (Excessive Substance Use)  Goal:  Increased Participation and Engagement (Excessive Substance Use)  Outcome: Ongoing, Progressing     Problem: Physiologic Impairment (Excessive Substance Use)  Goal: Improved Physiologic Symptoms (Excessive Substance Use)  Outcome: Ongoing, Progressing     Problem: Social, Occupational or Functional Impairment (Excessive Substance Use)  Goal: Enhanced Social, Occupational or Functional Skills (Excessive Substance Use)  Outcome: Ongoing, Progressing

## 2023-09-10 NOTE — NURSING
"Daily Nursing Note:      Behavior:    Patient (Elsa Chery is a 43 y.o. female, : 1980, MRN: [id number]) demonstrating an affect that was sad, flat, and anxious. Elsa demonstrating mood that is depressed and anxious. Elsa had an appearance that was clean. Elsa denies suicidal ideation. Elsa denies suicide plan. Elsa denies homicidal ideation. Elsa endorses hallucinations.    Sonias  height is 5' 6" (1.676 m) and weight is 63.9 kg (140 lb 12.8 oz). Her oral temperature is 98.6 °F (37 °C). Her blood pressure is 142/84 (abnormal) and her pulse is 99. Her respiration is 18 and oxygen saturation is 98%.     Sonias last BM was noted on: 23.      Intervention:    Encourage Elsa to perform self-hygiene, grooming, and changing of clothing. Monitor Elsa's behavior and program compliance. Monitor Elsa for suicidal ideation, homicidal ideation, sleep disturbance, and hallucinations. Encourage Elsa to eat all portions of meals and assess for meal preferences. Monitor Elsa for intake and output to ensure hydration. Notify the Physician/Physician Assistant/Advance Practice Registered Nurse (MD/PA/APRN) for any medication refusal and any change in patient condition.      Response:    Elsa verbalizes understand of unit process and procedures.       Plan:     Continue to monitor per MD/PA/APRN orders; maintain patient safety.    "

## 2023-09-10 NOTE — NURSING
"Daily Nursing Note:      Behavior:    Patient (Elsa Chery is a 43 y.o. female, : 1980, MRN: [id number]) demonstrating an affect that was flat and anxious. Elsa demonstrating mood that is anxious. Elsa had an appearance that was poor hygiene. Elsa denies suicidal ideation. Elsa denies suicide plan. Elsa denies homicidal ideation. Elsa endorses hallucinations.    Elsa's  height is 5' 6" (1.676 m) and weight is 63.9 kg (140 lb 12.8 oz). Her temperature is 98.1 °F (36.7 °C). Her blood pressure is 133/83 and her pulse is 100. Her respiration is 18 and oxygen saturation is 99%.     Sonias last BM was noted on: _______      Intervention:    Encourage Elsa to perform self-hygiene, grooming, and changing of clothing. Monitor Elsa's behavior and program compliance. Monitor Elsa for suicidal ideation, homicidal ideation, sleep disturbance, and hallucinations. Encourage Elsa to eat all portions of meals and assess for meal preferences. Monitor Elsa for intake and output to ensure hydration. Notify the Physician/Physician Assistant/Advance Practice Registered Nurse (MD/PA/APRN) for any medication refusal and any change in patient condition.      Response:    Elsa verbalizes understand of unit process and procedures. Elsa reported medications ______.      Plan:     Continue to monitor per MD/PA/APRN orders; maintain patient safety.   "

## 2023-09-11 PROBLEM — F39 MODERATE MOOD DISORDER: Status: ACTIVE | Noted: 2023-09-11

## 2023-09-11 PROBLEM — F11.21 OPIOID DEPENDENCE IN REMISSION: Status: ACTIVE | Noted: 2023-09-11

## 2023-09-11 PROBLEM — F41.9 ANXIETY DISORDER: Status: ACTIVE | Noted: 2023-09-11

## 2023-09-11 LAB
ALBUMIN SERPL-MCNC: 3.8 G/DL (ref 3.5–5)
ALBUMIN/GLOB SERPL: 1.1 RATIO (ref 1.1–2)
ALP SERPL-CCNC: 69 UNIT/L (ref 40–150)
ALT SERPL-CCNC: 13 UNIT/L (ref 0–55)
AST SERPL-CCNC: 14 UNIT/L (ref 5–34)
BACTERIA UR CULT: ABNORMAL
BASOPHILS # BLD AUTO: 0.04 X10(3)/MCL
BASOPHILS NFR BLD AUTO: 0.3 %
BILIRUB SERPL-MCNC: 0.4 MG/DL
BUN SERPL-MCNC: 14.9 MG/DL (ref 7–18.7)
CALCIUM SERPL-MCNC: 9.9 MG/DL (ref 8.4–10.2)
CHLORIDE SERPL-SCNC: 104 MMOL/L (ref 98–107)
CHOLEST SERPL-MCNC: 185 MG/DL
CHOLEST/HDLC SERPL: 5 {RATIO} (ref 0–5)
CO2 SERPL-SCNC: 26 MMOL/L (ref 22–29)
CREAT SERPL-MCNC: 0.93 MG/DL (ref 0.55–1.02)
EOSINOPHIL # BLD AUTO: 0.26 X10(3)/MCL (ref 0–0.9)
EOSINOPHIL NFR BLD AUTO: 2 %
ERYTHROCYTE [DISTWIDTH] IN BLOOD BY AUTOMATED COUNT: 12.7 % (ref 11.5–17)
EST. AVERAGE GLUCOSE BLD GHB EST-MCNC: 105.4 MG/DL
GFR SERPLBLD CREATININE-BSD FMLA CKD-EPI: >60 MLS/MIN/1.73/M2
GLOBULIN SER-MCNC: 3.6 GM/DL (ref 2.4–3.5)
GLUCOSE SERPL-MCNC: 79 MG/DL (ref 74–100)
HBA1C MFR BLD: 5.3 %
HCT VFR BLD AUTO: 42.7 % (ref 37–47)
HDLC SERPL-MCNC: 36 MG/DL (ref 35–60)
HGB BLD-MCNC: 13.9 G/DL (ref 12–16)
IMM GRANULOCYTES # BLD AUTO: 0.03 X10(3)/MCL (ref 0–0.04)
IMM GRANULOCYTES NFR BLD AUTO: 0.2 %
LDLC SERPL CALC-MCNC: 121 MG/DL (ref 50–140)
LYMPHOCYTES # BLD AUTO: 2.73 X10(3)/MCL (ref 0.6–4.6)
LYMPHOCYTES NFR BLD AUTO: 21.1 %
MCH RBC QN AUTO: 28.4 PG (ref 27–31)
MCHC RBC AUTO-ENTMCNC: 32.6 G/DL (ref 33–36)
MCV RBC AUTO: 87.1 FL (ref 80–94)
MONOCYTES # BLD AUTO: 0.86 X10(3)/MCL (ref 0.1–1.3)
MONOCYTES NFR BLD AUTO: 6.7 %
NEUTROPHILS # BLD AUTO: 9.01 X10(3)/MCL (ref 2.1–9.2)
NEUTROPHILS NFR BLD AUTO: 69.7 %
NRBC BLD AUTO-RTO: 0 %
PLATELET # BLD AUTO: 249 X10(3)/MCL (ref 130–400)
PMV BLD AUTO: 12.9 FL (ref 7.4–10.4)
POTASSIUM SERPL-SCNC: 4 MMOL/L (ref 3.5–5.1)
PROT SERPL-MCNC: 7.4 GM/DL (ref 6.4–8.3)
RBC # BLD AUTO: 4.9 X10(6)/MCL (ref 4.2–5.4)
SODIUM SERPL-SCNC: 142 MMOL/L (ref 136–145)
T PALLIDUM AB SER QL: NONREACTIVE
TRIGL SERPL-MCNC: 142 MG/DL (ref 37–140)
TSH SERPL-ACNC: 1.13 UIU/ML (ref 0.35–4.94)
VLDLC SERPL CALC-MCNC: 28 MG/DL
WBC # SPEC AUTO: 12.93 X10(3)/MCL (ref 4.5–11.5)

## 2023-09-11 PROCEDURE — 25000003 PHARM REV CODE 250: Performed by: PSYCHIATRY & NEUROLOGY

## 2023-09-11 PROCEDURE — 25000003 PHARM REV CODE 250: Performed by: PEDIATRICS

## 2023-09-11 PROCEDURE — 11400000 HC PSYCH PRIVATE ROOM

## 2023-09-11 PROCEDURE — 80053 COMPREHEN METABOLIC PANEL: CPT | Performed by: PSYCHIATRY & NEUROLOGY

## 2023-09-11 PROCEDURE — 25000003 PHARM REV CODE 250: Performed by: NURSE PRACTITIONER

## 2023-09-11 PROCEDURE — 85025 COMPLETE CBC W/AUTO DIFF WBC: CPT | Performed by: PSYCHIATRY & NEUROLOGY

## 2023-09-11 PROCEDURE — 83036 HEMOGLOBIN GLYCOSYLATED A1C: CPT | Performed by: PSYCHIATRY & NEUROLOGY

## 2023-09-11 PROCEDURE — 84443 ASSAY THYROID STIM HORMONE: CPT | Performed by: PSYCHIATRY & NEUROLOGY

## 2023-09-11 PROCEDURE — 86780 TREPONEMA PALLIDUM: CPT | Performed by: PSYCHIATRY & NEUROLOGY

## 2023-09-11 PROCEDURE — 80061 LIPID PANEL: CPT | Performed by: PSYCHIATRY & NEUROLOGY

## 2023-09-11 RX ORDER — SERTRALINE HYDROCHLORIDE 50 MG/1
50 TABLET, FILM COATED ORAL DAILY
Status: DISCONTINUED | OUTPATIENT
Start: 2023-09-11 | End: 2023-09-15 | Stop reason: HOSPADM

## 2023-09-11 RX ADMIN — BUSPIRONE HYDROCHLORIDE 10 MG: 5 TABLET ORAL at 08:09

## 2023-09-11 RX ADMIN — NITROFURANTOIN MONOHYDRATE/MACROCRYSTALS 100 MG: 25; 75 CAPSULE ORAL at 08:09

## 2023-09-11 RX ADMIN — LEVOTHYROXINE SODIUM 50 MCG: 50 TABLET ORAL at 08:09

## 2023-09-11 RX ADMIN — SERTRALINE HYDROCHLORIDE 50 MG: 50 TABLET ORAL at 11:09

## 2023-09-11 NOTE — PROGRESS NOTES
Elsa refused both TR groups despite encouragement; alternative material was offered.   09/11/23 1500   [facility] Group Therapy   Group Name Therapeutic Recreation   Specific Interventions Skilled Activity Creative Expression   Participation Level None   Participation Quality Refused

## 2023-09-11 NOTE — PLAN OF CARE
Behavioral Health Unit  Psychosocial History and Assessment  Progress Note      Patient Name: Elsa Chery YOB: 1980 SW: Ramila Greco Date: 9/11/2023    Chief Complaint: depression, mood swings, and mood elevation    Consent:     Did the patient consent for an interview with the ? Yes    Did the patient consent for the  to contact family/friend/caregiver?   No  Oxana Cote daughter, [phone]    Did the patient give consent for the  to inform family/friend/caregiver of his/her whereabouts or to discuss discharge planning? Yes    Source of Information: Face to face with patient    Is information obtained from interviews considered reliable?   yes    Reason for Admission:     There are no hospital problems to display for this patient.      History of Present Illness - (Patient Perception):   Pt states that she was OPC'd because she was thinking too much    Present biopsychosocial functioning: moderate symptoms    Past biopsychosocial functioning: Pt has history of higher functioning    Family and Marital/Relationship History:     Significant Other/Partner Relationships:  Single:  3 children    Family Relationships: Intact      Childhood History:     Where was [place] raised? [place]     Who raised the patient? Mom and dad      How does patient describe their childhood? good      Who is patient's primary support person? My family      Culture and Oriental orthodox:     Oriental orthodox: Anabaptist    How strong of a role does Anabaptism and spirituality play in patient's life? moderate    Cheondoism or spiritual concerns regarding treatment: observation of holy days     History of Abuse:   History of Abuse: Denies      Outcome: denies    Psychiatric and Medical History:     History of psychiatric illness or treatment: prior inpatient treatment and psychotropic management by PCP    Medical history: No past medical history on file.    Substance Abuse History:     Alcohol - (Patient  Perspective): Pt denies alcohol use  Social History     Substance and Sexual Activity   Alcohol Use None       Drugs - (Patient Perspective): pt states that she has a prescription for Adderall  Social History     Substance and Sexual Activity   Drug Use Not Currently         Education:     Currently Enrolled? No  High School (9-12) or GED    Special Education? No    Interested in Completing Education/GED: No    Employment and Financial:     Currently employed? Unemployed Last employed date: a couple of weeks ago    Source of Income:  family    Able to afford basic needs (food, shelter, utilities)? No    Who manages finances/personal affairs? self      Service:     Leivasy? no    Combat Service? No     Community Resources:     Describe present use of community resources: none at this time     Identify previously used community resources   (Include previous mental health treatment - outpatient and inpatient): inpatient mental health    Environmental:     Current living situation:Lives with family    Social Evaluation:     Patient Assets: General fund of knowledge, Supportive family/friends, Scientologist affliation, and Communicable skills    Patient Limitations: unemployed    High risk psychosocial issues that may impact discharge planning:   None at this time    Recommendations:     Anticipated discharge plan:   [address]    High risk issues requiring early treatment planning and immediate intervention: none at this time    Community resources needed for discharge planning:  aftercare treatment sources    Anticipated social work role(s) in treatment and discharge planning: advice and , group therapy, individual as needed, referral to aftercare resources    09/11/23 1037   Initial Information   Source of Information patient   Reason for Admission psychosis   Patient Aware of Diagnosis no   Spiritual Beliefs   Spiritual, Cultural Beliefs, Scientologist Practices, Values that Affect Care yes    Description of Beliefs that Will Affect Care Denominational   Substance Use/Withdrawal   Substance Use Current, used prior to admission;Denies use   Additional Tobacco Use   How many cigarettes do you typically have per day? 0   AUDIT-C (Alcohol Use Disorders ID Test)   Alcohol Use In Past Year 0-->never   Alcohol Amount Per Day In Past Year 0-->none   More Than 6 Drinks On One Occasion In Past Year 0-->never   Total Audit C Score 0

## 2023-09-11 NOTE — PROGRESS NOTES
"Cony is a 43 female admitted for Psychosis, NOS and Anxiety, NOS with a uds +amp with Pt reporting prescribed adderall. CTRS met with Pt 1:1, Cony was guarded and irritable, reporting ability to perform her ADL's. CTRS educated PT to TR group times and dates with Cony reporting refusal to attend groups, CTRS encouraged Pt to attend. Cony refused to ID a treatment goal reporting "I'm not going to be here for a week", despite CTRS encouraging a more mental wellness focus.     09/11/23 0833   General   Admit Date 09/09/23   Primary Diagnosis Psychosis, NOS   Secondary Diagnosis Anxiety, NOS   Congregation Buddhism   Number of Children 3   Children Living? 3   Occupation unemployed   Does the patient have dentures? No   If you were to take part in activities, which of the following would you prefer? Activities that you do alone   Do you feel like you have enough to keep you busy now? Yes   Do you believe that you have the opportunity for physical activity? Yes   Activity Capabilities Minimum   Subjective   Patient states They wanted me to see aboutmyself   Assessment   Mobility ambulates independently   Transfers independently   Visual Acuity normal vision   Visual Perception depth perception;color perception;recognizes letters;recognizes numbers   Hearing normal   Speech/Communication normal   Cognitive Concerns oriented x4   Emotional Concerns appears isolated;appears agitated   Leisure Interest Survey   Leisure Interest Survey No  (Nothing really)   Goals   Additional Documentation no  (I'm not going to be here for a week)   Plan   Planned Therapy Intervention Group Recreational Therapy   Expected Length of Stay 5-7days   PT Frequency Minimum of 3 visits per week       "

## 2023-09-11 NOTE — GROUP NOTE
Group Psychotherapy       Group Focus: Meds      Number of patients in attendance: 14    Group Start Time: 2030  Group End Time:  2100  Groups Date: 9/10/2023  Group Topic:  Behavioral Health  Group Department: [facility] - Behavioral Health Unit  Group Facilitators:  Alexandra Morales RN  _____________________________________________________________________    Patient Name: Elsa Chery  MRN: [id number]  Patient Class: IP- Psych   Admission Date\Time: 9/9/2023 12:20 AM  Hospital Length of Stay: 1  Primary Care Provider: Agustin Anne III, APRN-CNP     Referred by: Acute Psychiatry Unit Treatment Team     Target symptoms: Psychosis     Patient's response to treatment: Active Listening     Progress toward goals: Progressing adequately     Interval History:      Diagnosis: Psychosis     Plan: Continue treatment on APU

## 2023-09-11 NOTE — NURSING
"Daily Nursing Note:      Behavior:    Patient (Elsa Chery is a 43 y.o. female, : 1980, MRN: [id number]) demonstrating an affect that was flat and anxious. Elsa demonstrating mood that is anxious. Elsa had an appearance that was clean. Elsa denies suicidal ideation. Elsa denies suicide plan. Elsa denies homicidal ideation. Elsa endorses hallucinations.    Sonias  height is 5' 6" (1.676 m) and weight is 63.9 kg (140 lb 12.8 oz). Her temperature is 97.7 °F (36.5 °C). Her blood pressure is 124/85 and her pulse is 99. Her respiration is 18 and oxygen saturation is 100%.     Sonias last BM was noted on: _______      Intervention:    Encourage Elsa to perform self-hygiene, grooming, and changing of clothing. Monitor Elsa's behavior and program compliance. Monitor Elsa for suicidal ideation, homicidal ideation, sleep disturbance, and hallucinations. Encourage Elsa to eat all portions of meals and assess for meal preferences. Monitor Elsa for intake and output to ensure hydration. Notify the Physician/Physician Assistant/Advance Practice Registered Nurse (MD/PA/APRN) for any medication refusal and any change in patient condition.      Response:    Elsa verbalizes understand of unit process and procedures. Elsa reported medications ______.      Plan:     Continue to monitor per MD/PA/APRN orders; maintain patient safety.   "

## 2023-09-11 NOTE — PROGRESS NOTES
"9/11/2023  Elsa Chery   1980   [id number]        Psychiatry Progress Note     Chief Complaint: "I'm all right"    SUBJECTIVE:   Elsa Chery is a 43 y.o. female placed under a PEC at [facility] after presenting on an OPC for psychotic behavior.    This morning, she states that she does not have a current psychiatrist, but does admit that she has gone to [facility] in the past.  Her records reports that she sees Dr. Bolton and is on Subutex and Adderall.  Denies hallucinations today.  ED staff had spoken to patient's daughter who reported that patient had been paranoid and hallucinating and thought people were out to kill her.  No current objective symptoms of psychosis.  Will start on anxiety medication today and will monitor progress.    UDS: (+)amphetamine  Blood alcohol: <10       Current Medications:   Scheduled Meds:    busPIRone  10 mg Oral BID    levothyroxine  50 mcg Oral Daily    mupirocin   Nasal BID    nitrofurantoin (macrocrystal-monohydrate)  100 mg Oral Q12H      PRN Meds: diphenhydrAMINE, diphenhydrAMINE, haloperidol lactate, haloperidoL, hydrOXYzine HCL, lorazepam, LORazepam, magnesium hydroxide 400 mg/5 ml, traZODone   Psychotherapeutics (From admission, onward)      Start     Stop Route Frequency Ordered    09/09/23 2100  busPIRone tablet 10 mg         -- Oral 2 times daily 09/09/23 1512    09/09/23 0626  haloperidol lactate injection 10 mg         -- IM Every 6 hours PRN 09/09/23 0626    09/09/23 0626  haloperidoL tablet 10 mg         -- Oral Every 4 hours PRN 09/09/23 0626    09/09/23 0626  LORazepam injection 2 mg         -- IM Every 6 hours PRN 09/09/23 0626    09/09/23 0626  LORazepam tablet 2 mg         -- Oral Every 6 hours PRN 09/09/23 0626    09/09/23 0626  traZODone tablet 100 mg         -- Oral Nightly PRN 09/09/23 0626            Allergies:   Review of patient's allergies indicates:   Allergen Reactions    Cyclobenzaprine Other (See Comments)        OBJECTIVE:   Vitals "   Vitals:    09/10/23 1100   BP: 124/85   Pulse: 99   Resp: 18   Temp: 97.7 °F (36.5 °C)        Labs/Imaging/Studies:   Recent Results (from the past 36 hour(s))   Comprehensive Metabolic Panel    Collection Time: 09/11/23  6:19 AM   Result Value Ref Range    Sodium Level 142 136 - 145 mmol/L    Potassium Level 4.0 3.5 - 5.1 mmol/L    Chloride 104 98 - 107 mmol/L    Carbon Dioxide 26 22 - 29 mmol/L    Glucose Level 79 74 - 100 mg/dL    Blood Urea Nitrogen 14.9 7.0 - 18.7 mg/dL    Creatinine 0.93 0.55 - 1.02 mg/dL    Calcium Level Total 9.9 8.4 - 10.2 mg/dL    Protein Total 7.4 6.4 - 8.3 gm/dL    Albumin Level 3.8 3.5 - 5.0 g/dL    Globulin 3.6 (H) 2.4 - 3.5 gm/dL    Albumin/Globulin Ratio 1.1 1.1 - 2.0 ratio    Bilirubin Total 0.4 <=1.5 mg/dL    Alkaline Phosphatase 69 40 - 150 unit/L    Alanine Aminotransferase 13 0 - 55 unit/L    Aspartate Aminotransferase 14 5 - 34 unit/L    eGFR >60 mls/min/1.73/m2   Hemoglobin A1C    Collection Time: 09/11/23  6:19 AM   Result Value Ref Range    Hemoglobin A1c 5.3 <=7.0 %    Estimated Average Glucose 105.4 mg/dL   TSH    Collection Time: 09/11/23  6:19 AM   Result Value Ref Range    Thyroid Stimulating Hormone 1.126 0.350 - 4.940 uIU/mL   Lipid Panel    Collection Time: 09/11/23  6:19 AM   Result Value Ref Range    Cholesterol Total 185 <=200 mg/dL    HDL Cholesterol 36 35 - 60 mg/dL    Triglyceride 142 (H) 37 - 140 mg/dL    Cholesterol/HDL Ratio 5 0 - 5    Very Low Density Lipoprotein 28     LDL Cholesterol 121.00 50.00 - 140.00 mg/dL   CBC with Differential    Collection Time: 09/11/23  6:19 AM   Result Value Ref Range    WBC 12.93 (H) 4.50 - 11.50 x10(3)/mcL    RBC 4.90 4.20 - 5.40 x10(6)/mcL    Hgb 13.9 12.0 - 16.0 g/dL    Hct 42.7 37.0 - 47.0 %    MCV 87.1 80.0 - 94.0 fL    MCH 28.4 27.0 - 31.0 pg    MCHC 32.6 (L) 33.0 - 36.0 g/dL    RDW 12.7 11.5 - 17.0 %    Platelet 249 130 - 400 x10(3)/mcL    MPV 12.9 (H) 7.4 - 10.4 fL    Neut % 69.7 %    Lymph % 21.1 %    Mono % 6.7 %  "   Eos % 2.0 %    Basophil % 0.3 %    Lymph # 2.73 0.6 - 4.6 x10(3)/mcL    Neut # 9.01 2.1 - 9.2 x10(3)/mcL    Mono # 0.86 0.1 - 1.3 x10(3)/mcL    Eos # 0.26 0 - 0.9 x10(3)/mcL    Baso # 0.04 <=0.2 x10(3)/mcL    IG# 0.03 0 - 0.04 x10(3)/mcL    IG% 0.2 %    NRBC% 0.0 %          Medical Review Of Systems:  Constitutional: negative  Respiratory: negative  Cardiovascular: negative  Gastrointestinal: negative  Genitourinary:negative  Musculoskeletal:negative  Neurological: negative      Psychiatric Mental Status Exam:  General Appearance: appears stated age, well-developed, well-nourished  Arousal: alert  Behavior: cooperative  Movements and Motor Activity: no abnormal involuntary movements noted  Orientation: oriented to person, place, time, and situation  Speech: normal rate, normal rhythm, normal volume, normal tone  Mood: "All right"  Affect: constricted  Thought Process: linear  Associations: intact  Thought Content and Perceptions: no suicidal ideation, no homicidal ideation, denies hallucinations, no paranoid ideation, no ideas of reference  Recent and Remote Memory: recent memory intact, remote memory intact; per interview/observation with patient  Attention and Concentration: intact, attentive to conversation; per interview/observation with patient  Fund of Knowledge: intact, aware of current events, vocabulary appropriate; based on history, vocabulary, fund of knowledge, syntax, grammar, and content  Insight: questionable; based on understanding of severity of illness and HPI  Judgment: questionable; based on patient's behavior and HPI      ASSESSMENT/PLAN:   Problems Addressed/Diagnoses:  Unspecified Mood Disorder (F39)  Anxiety (F41.9)  Opioid use disorder, in remission (F11.21)  R/o Amphetamine use disorder      UTI    No past medical history on file.     Plan:  Mood disorder  -Zoloft 50mg daily    Anxiety  -Zoloft 50mg daily  -Ok to continue Buspar    Opioid use, in remission  -Group/Individual " psychotherapy      Expected Disposition Plan: Home        Royal Cohen M.D.

## 2023-09-11 NOTE — NURSING
"Daily Nursing Note:      Behavior:    Patient (Elsa Chery is a 43 y.o. female, : 1980, MRN: [id number]) demonstrating an affect that was sad, flat, anxious, irritable, and  labile. Elsa demonstrating mood that is depressed, anxious, and swings. Elsa had an appearance that was clean. Elsa denies suicidal ideation. Elsa denies suicide plan. Elsa denies homicidal ideation. Elsa endorses hallucinations.    Sonias  height is 5' 6" (1.676 m) and weight is 63.9 kg (140 lb 12.8 oz). Her temperature is 97.7 °F (36.5 °C). Her blood pressure is 124/85 and her pulse is 99. Her respiration is 18 and oxygen saturation is 100%.     Sonias last BM was noted on: 9/10/23.      Intervention:    Encourage Elsa to perform self-hygiene, grooming, and changing of clothing. Monitor Sonias behavior and program compliance. Monitor Elsa for suicidal ideation, homicidal ideation, sleep disturbance, and hallucinations. Encourage Elsa to eat all portions of meals and assess for meal preferences. Monitor Elsa for intake and output to ensure hydration. Notify the Physician/Physician Assistant/Advance Practice Registered Nurse (MD/PA/APRN) for any medication refusal and any change in patient condition.      Response:    Elsa verbalizes understand of unit process and procedures.       Plan:     Continue to monitor per MD/PA/APRN orders; maintain patient safety.    "

## 2023-09-12 PROCEDURE — 11400000 HC PSYCH PRIVATE ROOM

## 2023-09-12 PROCEDURE — 25000003 PHARM REV CODE 250: Performed by: PSYCHIATRY & NEUROLOGY

## 2023-09-12 PROCEDURE — 25000003 PHARM REV CODE 250: Performed by: NURSE PRACTITIONER

## 2023-09-12 PROCEDURE — 25000003 PHARM REV CODE 250: Performed by: PEDIATRICS

## 2023-09-12 RX ADMIN — SERTRALINE HYDROCHLORIDE 50 MG: 50 TABLET ORAL at 08:09

## 2023-09-12 RX ADMIN — NITROFURANTOIN MONOHYDRATE/MACROCRYSTALS 100 MG: 25; 75 CAPSULE ORAL at 08:09

## 2023-09-12 RX ADMIN — BUSPIRONE HYDROCHLORIDE 10 MG: 5 TABLET ORAL at 08:09

## 2023-09-12 RX ADMIN — LEVOTHYROXINE SODIUM 50 MCG: 50 TABLET ORAL at 08:09

## 2023-09-12 NOTE — NURSING
"Daily Nursing Note:      Behavior:    Patient (Elsa Chery is a 43 y.o. female, : 1980, MRN: [id number]) demonstrating an affect that was flat and anxious. Elsa demonstrating mood that is anxious. Elsa had an appearance that was clean. Elsa denies suicidal ideation. Elsa denies suicide plan. Elsa denies homicidal ideation. Elsa denies hallucinations.    Elsa's  height is 5' 6" (1.676 m) and weight is 63.9 kg (140 lb 12.8 oz). Her temperature is 98.2 °F (36.8 °C). Her blood pressure is 132/90 (abnormal) and her pulse is 86. Her respiration is 18 and oxygen saturation is 100%.     Sonias last BM was noted on: _______      Intervention:    Encourage Elsa to perform self-hygiene, grooming, and changing of clothing. Monitor Elsa's behavior and program compliance. Monitor Elsa for suicidal ideation, homicidal ideation, sleep disturbance, and hallucinations. Encourage Elsa to eat all portions of meals and assess for meal preferences. Monitor Elsa for intake and output to ensure hydration. Notify the Physician/Physician Assistant/Advance Practice Registered Nurse (MD/PA/APRN) for any medication refusal and any change in patient condition.      Response:    Elsa verbalizes understand of unit process and procedures. Elsa reported medications ______.      Plan:     Continue to monitor per MD/PA/APRN orders; maintain patient safety.   "

## 2023-09-12 NOTE — PROGRESS NOTES
Cony refused both TR groups despite encouragement; Alternative material was offered.   09/12/23 1400   [facility] Group Therapy   Group Name Therapeutic Recreation   Specific Interventions Skilled Activity Creative Expression   Participation Level None   Participation Quality Refused

## 2023-09-12 NOTE — PROGRESS NOTES
"9/12/2023  Elsa Chery   1980   [id number]        Psychiatry Progress Note     Chief Complaint: "I'm all right"    SUBJECTIVE:   Elsa Chery is a 43 y.o. female placed under a PEC at [facility] after presenting on an OPC for psychotic behavior.    Today patient states that she is feeling better. She states her anxiety is improved. Zoloft was started today and no issues noted with this. She states that she is eating and sleeping well. No overt behavioral issues reported. She was calm and cooperative during this exam. Will continue with current POC and monitor for mood improvement.     UDS: (+)amphetamine  Blood alcohol: <10       Current Medications:   Scheduled Meds:    busPIRone  10 mg Oral BID    levothyroxine  50 mcg Oral Daily    mupirocin   Nasal BID    nitrofurantoin (macrocrystal-monohydrate)  100 mg Oral Q12H    sertraline  50 mg Oral Daily      PRN Meds: diphenhydrAMINE, diphenhydrAMINE, haloperidol lactate, haloperidoL, hydrOXYzine HCL, lorazepam, LORazepam, magnesium hydroxide 400 mg/5 ml, traZODone   Psychotherapeutics (From admission, onward)      Start     Stop Route Frequency Ordered    09/11/23 1130  sertraline tablet 50 mg         -- Oral Daily 09/11/23 1119    09/09/23 2100  busPIRone tablet 10 mg         -- Oral 2 times daily 09/09/23 1512    09/09/23 0626  haloperidol lactate injection 10 mg         -- IM Every 6 hours PRN 09/09/23 0626    09/09/23 0626  haloperidoL tablet 10 mg         -- Oral Every 4 hours PRN 09/09/23 0626    09/09/23 0626  LORazepam injection 2 mg         -- IM Every 6 hours PRN 09/09/23 0626    09/09/23 0626  LORazepam tablet 2 mg         -- Oral Every 6 hours PRN 09/09/23 0626    09/09/23 0626  traZODone tablet 100 mg         -- Oral Nightly PRN 09/09/23 0626            Allergies:   Review of patient's allergies indicates:   Allergen Reactions    Cyclobenzaprine Other (See Comments)        OBJECTIVE:   Vitals   Vitals:    09/12/23 0701   BP: 127/75   Pulse: 98   Resp: 16 "   Temp: 98.5 °F (36.9 °C)        Labs/Imaging/Studies:   Recent Results (from the past 36 hour(s))   Comprehensive Metabolic Panel    Collection Time: 09/11/23  6:19 AM   Result Value Ref Range    Sodium Level 142 136 - 145 mmol/L    Potassium Level 4.0 3.5 - 5.1 mmol/L    Chloride 104 98 - 107 mmol/L    Carbon Dioxide 26 22 - 29 mmol/L    Glucose Level 79 74 - 100 mg/dL    Blood Urea Nitrogen 14.9 7.0 - 18.7 mg/dL    Creatinine 0.93 0.55 - 1.02 mg/dL    Calcium Level Total 9.9 8.4 - 10.2 mg/dL    Protein Total 7.4 6.4 - 8.3 gm/dL    Albumin Level 3.8 3.5 - 5.0 g/dL    Globulin 3.6 (H) 2.4 - 3.5 gm/dL    Albumin/Globulin Ratio 1.1 1.1 - 2.0 ratio    Bilirubin Total 0.4 <=1.5 mg/dL    Alkaline Phosphatase 69 40 - 150 unit/L    Alanine Aminotransferase 13 0 - 55 unit/L    Aspartate Aminotransferase 14 5 - 34 unit/L    eGFR >60 mls/min/1.73/m2   Hemoglobin A1C    Collection Time: 09/11/23  6:19 AM   Result Value Ref Range    Hemoglobin A1c 5.3 <=7.0 %    Estimated Average Glucose 105.4 mg/dL   TSH    Collection Time: 09/11/23  6:19 AM   Result Value Ref Range    Thyroid Stimulating Hormone 1.126 0.350 - 4.940 uIU/mL   Lipid Panel    Collection Time: 09/11/23  6:19 AM   Result Value Ref Range    Cholesterol Total 185 <=200 mg/dL    HDL Cholesterol 36 35 - 60 mg/dL    Triglyceride 142 (H) 37 - 140 mg/dL    Cholesterol/HDL Ratio 5 0 - 5    Very Low Density Lipoprotein 28     LDL Cholesterol 121.00 50.00 - 140.00 mg/dL   SYPHILIS ANTIBODY (WITH REFLEX RPR)    Collection Time: 09/11/23  6:19 AM   Result Value Ref Range    Syphilis Antibody Nonreactive Nonreactive, Equivocal   CBC with Differential    Collection Time: 09/11/23  6:19 AM   Result Value Ref Range    WBC 12.93 (H) 4.50 - 11.50 x10(3)/mcL    RBC 4.90 4.20 - 5.40 x10(6)/mcL    Hgb 13.9 12.0 - 16.0 g/dL    Hct 42.7 37.0 - 47.0 %    MCV 87.1 80.0 - 94.0 fL    MCH 28.4 27.0 - 31.0 pg    MCHC 32.6 (L) 33.0 - 36.0 g/dL    RDW 12.7 11.5 - 17.0 %    Platelet 249 130 -  "400 x10(3)/mcL    MPV 12.9 (H) 7.4 - 10.4 fL    Neut % 69.7 %    Lymph % 21.1 %    Mono % 6.7 %    Eos % 2.0 %    Basophil % 0.3 %    Lymph # 2.73 0.6 - 4.6 x10(3)/mcL    Neut # 9.01 2.1 - 9.2 x10(3)/mcL    Mono # 0.86 0.1 - 1.3 x10(3)/mcL    Eos # 0.26 0 - 0.9 x10(3)/mcL    Baso # 0.04 <=0.2 x10(3)/mcL    IG# 0.03 0 - 0.04 x10(3)/mcL    IG% 0.2 %    NRBC% 0.0 %          Medical Review Of Systems:  Constitutional: negative  Respiratory: negative  Cardiovascular: negative  Gastrointestinal: negative  Genitourinary:negative  Musculoskeletal:negative  Neurological: negative      Psychiatric Mental Status Exam:  General Appearance: appears stated age, well-developed, well-nourished  Arousal: alert  Behavior: cooperative  Movements and Motor Activity: no abnormal involuntary movements noted  Orientation: oriented to person, place, time, and situation  Speech: normal rate, normal rhythm, normal volume, normal tone  Mood: "All right"  Affect: constricted  Thought Process: linear  Associations: intact  Thought Content and Perceptions: no suicidal ideation, no homicidal ideation, denies hallucinations, no paranoid ideation, no ideas of reference  Recent and Remote Memory: recent memory intact, remote memory intact; per interview/observation with patient  Attention and Concentration: intact, attentive to conversation; per interview/observation with patient  Fund of Knowledge: intact, aware of current events, vocabulary appropriate; based on history, vocabulary, fund of knowledge, syntax, grammar, and content  Insight: questionable; based on understanding of severity of illness and HPI  Judgment: questionable; based on patient's behavior and HPI      ASSESSMENT/PLAN:   Problems Addressed/Diagnoses:  Unspecified Mood Disorder (F39)  Anxiety (F41.9)  Opioid use disorder, in remission (F11.21)  R/o Amphetamine use disorder      UTI    No past medical history on file.     Plan:  Mood disorder  -Zoloft 50mg " daily    Anxiety  -Zoloft 50mg daily  -Ok to continue Buspar    Opioid use, in remission  -Group/Individual psychotherapy      Expected Disposition Plan: [facility], [id number]

## 2023-09-13 PROCEDURE — 25000003 PHARM REV CODE 250: Performed by: PEDIATRICS

## 2023-09-13 PROCEDURE — 25000003 PHARM REV CODE 250: Performed by: NURSE PRACTITIONER

## 2023-09-13 PROCEDURE — 25000003 PHARM REV CODE 250: Performed by: PSYCHIATRY & NEUROLOGY

## 2023-09-13 PROCEDURE — 11400000 HC PSYCH PRIVATE ROOM

## 2023-09-13 RX ORDER — ARIPIPRAZOLE 5 MG/1
5 TABLET ORAL DAILY
Status: DISCONTINUED | OUTPATIENT
Start: 2023-09-13 | End: 2023-09-15 | Stop reason: HOSPADM

## 2023-09-13 RX ADMIN — BUSPIRONE HYDROCHLORIDE 10 MG: 5 TABLET ORAL at 08:09

## 2023-09-13 RX ADMIN — NITROFURANTOIN MONOHYDRATE/MACROCRYSTALS 100 MG: 25; 75 CAPSULE ORAL at 08:09

## 2023-09-13 RX ADMIN — LEVOTHYROXINE SODIUM 50 MCG: 50 TABLET ORAL at 08:09

## 2023-09-13 RX ADMIN — SERTRALINE HYDROCHLORIDE 50 MG: 50 TABLET ORAL at 08:09

## 2023-09-13 RX ADMIN — ARIPIPRAZOLE 5 MG: 5 TABLET ORAL at 02:09

## 2023-09-13 NOTE — NURSING
"Daily Nursing Note:      Behavior:    Patient (Elsa Chery is a 43 y.o. female, : 1980, MRN: [id number]) demonstrating an affect that was flat and anxious. Elsa demonstrating mood that is anxious. Elsa had an appearance that was disheveled. Elsa denies suicidal ideation. Elsa denies suicide plan. Elsa denies homicidal ideation. Elsa endorses hallucinations.    Elsa's  height is 5' 6" (1.676 m) and weight is 63.9 kg (140 lb 12.8 oz). Her temperature is 98.9 °F (37.2 °C). Her blood pressure is 146/85 (abnormal) and her pulse is 91. Her respiration is 18 and oxygen saturation is 97%.     Sonias last BM was noted on: _______      Intervention:    Encourage Elsa to perform self-hygiene, grooming, and changing of clothing. Monitor Elsa's behavior and program compliance. Monitor Elsa for suicidal ideation, homicidal ideation, sleep disturbance, and hallucinations. Encourage Elsa to eat all portions of meals and assess for meal preferences. Monitor Elsa for intake and output to ensure hydration. Notify the Physician/Physician Assistant/Advance Practice Registered Nurse (MD/PA/APRN) for any medication refusal and any change in patient condition.      Response:    Elsa verbalizes understand of unit process and procedures. Elsa reported medications ______.      Plan:     Continue to monitor per MD/PA/APRN orders; maintain patient safety.   "

## 2023-09-13 NOTE — PLAN OF CARE
Jesus Manuel refuses to attend TR groups, does not interact with peers nor staff, and is not attending her ADL's.    Nadege attended treatment team, was superficial and distracted but cooperative, focused on discharge, and not attending her ADL's.

## 2023-09-13 NOTE — GROUP NOTE
Group Psychotherapy       Group Focus: Life Skills      Number of patients in attendance: 10    Group focus on emotion regulation skills. Completed worksheet on ways to describe emotions, focus on anger and love. Review of prompting events, interpretations of events, biological changes, expressions of emotions, and aftereffects of emotions.     Group Start Time: 1045  Group End Time:  1130  Groups Date: 9/13/2023  Group Topic:  Behavioral Health  Group Department: [facility] - Behavioral Health Unit  Group Facilitators:  Simin Robertson SSW   _____________________________________________________________________    Patient Name: Elsa Chery  MRN: [id number]  Patient Class: IP- Psych   Admission Date\Time: 9/9/2023 12:20 AM  Hospital Length of Stay: 4  Primary Care Provider: Agustin Anne III, APRN-CNP     Referred by: Acute Psychiatry Unit Treatment Team     Target symptoms: Poor Coping Skills     Patient's response to treatment: did not attend group     Progress toward goals: Minimal progress     Interval History:      Diagnosis:      Plan: Continue treatment on APU

## 2023-09-13 NOTE — CARE UPDATE
Due to [facility] system being down and backlogged; pt will have to follow-up directly for appt.

## 2023-09-13 NOTE — CARE UPDATE
Treatment Team    Pt seem for treatment team today with interdisciplinary team.  Pt is Cooperative and Anxious with Tx team. Pt reports symptoms at this time. Pt reported hx of AH but denies any during tx team. MD changed pt meds at this time. Treatment teams goals Not met at this time. Pt DC plan is home. DC date scheduled for 9.15.2023.

## 2023-09-13 NOTE — PROGRESS NOTES
"9/13/2023  Elsa Chery   1980   [id number]        Psychiatry Progress Note     Chief Complaint: "I'm all right"    SUBJECTIVE:   Elsa Chery is a 43 y.o. female placed under a PEC at [facility] after presenting on an OPC for psychotic behavior.    Not attending groups.  Spends nearly all of her time in her room.  Patient states that she spends her time in her room because she is cold.  States that she last had auditory hallucinations over a year ago.  She did ask about taking a shower today.  Tolerating current medication regimen without issues.  Will add Abilify to her regimen and will monitor progress.    UDS: (+)amphetamine  Blood alcohol: <10       Current Medications:   Scheduled Meds:    busPIRone  10 mg Oral BID    levothyroxine  50 mcg Oral Daily    mupirocin   Nasal BID    nitrofurantoin (macrocrystal-monohydrate)  100 mg Oral Q12H    sertraline  50 mg Oral Daily      PRN Meds: diphenhydrAMINE, diphenhydrAMINE, haloperidol lactate, haloperidoL, hydrOXYzine HCL, lorazepam, LORazepam, magnesium hydroxide 400 mg/5 ml, traZODone   Psychotherapeutics (From admission, onward)      Start     Stop Route Frequency Ordered    09/11/23 1130  sertraline tablet 50 mg         -- Oral Daily 09/11/23 1119    09/09/23 2100  busPIRone tablet 10 mg         -- Oral 2 times daily 09/09/23 1512    09/09/23 0626  haloperidol lactate injection 10 mg         -- IM Every 6 hours PRN 09/09/23 0626    09/09/23 0626  haloperidoL tablet 10 mg         -- Oral Every 4 hours PRN 09/09/23 0626    09/09/23 0626  LORazepam injection 2 mg         -- IM Every 6 hours PRN 09/09/23 0626    09/09/23 0626  LORazepam tablet 2 mg         -- Oral Every 6 hours PRN 09/09/23 0626    09/09/23 0626  traZODone tablet 100 mg         -- Oral Nightly PRN 09/09/23 0626            Allergies:   Review of patient's allergies indicates:   Allergen Reactions    Cyclobenzaprine Other (See Comments)        OBJECTIVE:   Vitals   Vitals:    09/13/23 0701   BP: (!) " "134/109   Pulse: 106   Resp: 16   Temp: 98.7 °F (37.1 °C)        Labs/Imaging/Studies:   No results found for this or any previous visit (from the past 36 hour(s)).         Medical Review Of Systems:  Constitutional: negative  Respiratory: negative  Cardiovascular: negative  Gastrointestinal: negative  Genitourinary:negative  Musculoskeletal:negative  Neurological: negative      Psychiatric Mental Status Exam:  General Appearance: appears stated age, well-developed, well-nourished  Arousal: alert  Behavior: cooperative  Movements and Motor Activity: no abnormal involuntary movements noted  Orientation: oriented to person, place, time, and situation  Speech: normal rate, normal rhythm, normal volume, normal tone  Mood: "All right"  Affect: constricted  Thought Process: linear  Associations: intact  Thought Content and Perceptions: questionable hallucinations, no suicidal ideation, no homicidal ideation, no paranoid ideation, no ideas of reference  Recent and Remote Memory: recent memory intact, remote memory intact; per interview/observation with patient  Attention and Concentration: intact, attentive to conversation; per interview/observation with patient  Fund of Knowledge: intact, aware of current events, vocabulary appropriate; based on history, vocabulary, fund of knowledge, syntax, grammar, and content  Insight: questionable; based on understanding of severity of illness and HPI  Judgment: questionable; based on patient's behavior and HPI      ASSESSMENT/PLAN:   Problems Addressed/Diagnoses:  Unspecified Mood Disorder (F39)  Anxiety (F41.9)  Opioid use disorder, in remission (F11.21)  R/o Amphetamine use disorder      UTI    No past medical history on file.     Plan:  Mood disorder  -Continue Zoloft 50mg daily  -Abilify 5mg daily    Anxiety  -Continue Zoloft 50mg daily  -Ok to continue Buspar    Opioid use, in remission  -Group/Individual psychotherapy      Expected Disposition Plan: Home        Royal PALMA" MARICHUY Cohen.

## 2023-09-13 NOTE — NURSING
"Daily Nursing Note:      Behavior:    Patient (Elsa Chery is a 43 y.o. female, : 1980, MRN: [id number]) demonstrating an affect that was flat. Elsa demonstrating mood that is anxious. Elsa had an appearance that was disheveled and poor hygiene. Elsa denies suicidal ideation. Elsa denies suicide plan She says her mood is good. She answers questions appropriately.  Her speech is slightly delayed.    Sonias  height is 5' 6" (1.676 m) and weight is 63.9 kg (140 lb 12.8 oz). Her temperature is 98.7 °F (37.1 °C). Her blood pressure is 134/109 (abnormal) and her pulse is 106. Her respiration is 16 and oxygen saturation is 96%.     Intervention:    Encourage Elsa to perform self-hygiene, grooming, and changing of clothing. Monitor Elsa's behavior and program compliance. Monitor Elsa for suicidal ideation, homicidal ideation, sleep disturbance, and hallucinations. Encourage Elsa to eat all portions of meals and assess for meal preferences. Monitor Elsa for intake and output to ensure hydration. Notify the Physician/Physician Assistant/Advance Practice Registered Nurse (MD/PA/APRN) for any medication refusal and any change in patient condition.      Response:    Elsa verbalizes understand of unit process and procedures. Elsa reported medications are helping. Medication changes done with patient participation      Plan:     Continue to monitor per MD/PA/APRN orders; maintain patient safety.   "

## 2023-09-14 VITALS
TEMPERATURE: 99 F | SYSTOLIC BLOOD PRESSURE: 141 MMHG | OXYGEN SATURATION: 97 % | DIASTOLIC BLOOD PRESSURE: 89 MMHG | HEART RATE: 86 BPM | RESPIRATION RATE: 18 BRPM | BODY MASS INDEX: 22.63 KG/M2 | WEIGHT: 140.81 LBS | HEIGHT: 66 IN

## 2023-09-14 PROCEDURE — 11400000 HC PSYCH PRIVATE ROOM

## 2023-09-14 PROCEDURE — 25000003 PHARM REV CODE 250: Performed by: NURSE PRACTITIONER

## 2023-09-14 PROCEDURE — 25000003 PHARM REV CODE 250: Performed by: PEDIATRICS

## 2023-09-14 PROCEDURE — 25000003 PHARM REV CODE 250: Performed by: PSYCHIATRY & NEUROLOGY

## 2023-09-14 RX ORDER — BUSPIRONE HYDROCHLORIDE 10 MG/1
10 TABLET ORAL 2 TIMES DAILY
Qty: 60 TABLET | Refills: 0 | Status: SHIPPED | OUTPATIENT
Start: 2023-09-14 | End: 2023-09-15 | Stop reason: SDUPTHER

## 2023-09-14 RX ORDER — SERTRALINE HYDROCHLORIDE 50 MG/1
50 TABLET, FILM COATED ORAL DAILY
Qty: 30 TABLET | Refills: 0 | Status: SHIPPED | OUTPATIENT
Start: 2023-09-15 | End: 2023-10-15

## 2023-09-14 RX ORDER — ARIPIPRAZOLE 5 MG/1
5 TABLET ORAL DAILY
Qty: 30 TABLET | Refills: 0 | Status: SHIPPED | OUTPATIENT
Start: 2023-09-15 | End: 2023-09-15 | Stop reason: SDUPTHER

## 2023-09-14 RX ORDER — LEVOTHYROXINE SODIUM 50 UG/1
50 TABLET ORAL DAILY
Qty: 30 TABLET | Refills: 11 | Status: SHIPPED | OUTPATIENT
Start: 2023-09-15 | End: 2023-09-15 | Stop reason: SDUPTHER

## 2023-09-14 RX ADMIN — SERTRALINE HYDROCHLORIDE 50 MG: 50 TABLET ORAL at 08:09

## 2023-09-14 RX ADMIN — ARIPIPRAZOLE 5 MG: 5 TABLET ORAL at 08:09

## 2023-09-14 RX ADMIN — NITROFURANTOIN MONOHYDRATE/MACROCRYSTALS 100 MG: 25; 75 CAPSULE ORAL at 08:09

## 2023-09-14 RX ADMIN — LEVOTHYROXINE SODIUM 50 MCG: 50 TABLET ORAL at 08:09

## 2023-09-14 RX ADMIN — BUSPIRONE HYDROCHLORIDE 10 MG: 5 TABLET ORAL at 08:09

## 2023-09-14 NOTE — NURSING
"Daily Nursing Note:      Behavior:    Patient (Elsa Chery is a 43 y.o. female, : 1980, MRN: [id number]) demonstrating an affect that was sad, flat, anxious, irritable, and  labile. Elsa demonstrating mood that is depressed, anxious, and swings. Elsa had an appearance that was disheveled. Elsa denies suicidal ideation. Elsa denies suicide plan. Elsa denies homicidal ideation. Elsa endorses hallucinations.    Sonias  height is 5' 6" (1.676 m) and weight is 63.9 kg (140 lb 12.8 oz). Her oral temperature is 98.6 °F (37 °C). Her blood pressure is 141/89 (abnormal) and her pulse is 86. Her respiration is 18 and oxygen saturation is 97%.     Sonias last BM was noted on: 23.      Intervention:    Encourage Elsa to perform self-hygiene, grooming, and changing of clothing. Monitor Sonias behavior and program compliance. Monitor Elsa for suicidal ideation, homicidal ideation, sleep disturbance, and hallucinations. Encourage Elsa to eat all portions of meals and assess for meal preferences. Monitor Elsa for intake and output to ensure hydration. Notify the Physician/Physician Assistant/Advance Practice Registered Nurse (MD/PA/APRN) for any medication refusal and any change in patient condition.      Response:    Elsa verbalizes understand of unit process and procedures.       Plan:     Continue to monitor per MD/PA/APRN orders; maintain patient safety.    "

## 2023-09-14 NOTE — PROGRESS NOTES
"9/14/2023  Elsa Chery   1980   [id number]        Psychiatry Progress Note     Chief Complaint: "I'm all right"    SUBJECTIVE:   Elsa Chery is a 43 y.o. female placed under a PEC at [facility] after presenting on an OPC for psychotic behavior.    Patient states today that she is feeling better. She attending groups yesterday. Denies SI/HI and AVH.  Tolerating current medication regimen without issues.  States that she is sleeping well but she does not have much of an appetite. Will continue current POC and plan for discharge tomorrow.     UDS: (+)amphetamine  Blood alcohol: <10       Current Medications:   Scheduled Meds:    ARIPiprazole  5 mg Oral Daily    busPIRone  10 mg Oral BID    levothyroxine  50 mcg Oral Daily    nitrofurantoin (macrocrystal-monohydrate)  100 mg Oral Q12H    sertraline  50 mg Oral Daily      PRN Meds: diphenhydrAMINE, diphenhydrAMINE, haloperidol lactate, haloperidoL, hydrOXYzine HCL, lorazepam, LORazepam, magnesium hydroxide 400 mg/5 ml, traZODone   Psychotherapeutics (From admission, onward)      Start     Stop Route Frequency Ordered    09/13/23 1130  ARIPiprazole tablet 5 mg         -- Oral Daily 09/13/23 1025    09/11/23 1130  sertraline tablet 50 mg         -- Oral Daily 09/11/23 1119    09/09/23 2100  busPIRone tablet 10 mg         -- Oral 2 times daily 09/09/23 1512    09/09/23 0626  haloperidol lactate injection 10 mg         -- IM Every 6 hours PRN 09/09/23 0626    09/09/23 0626  haloperidoL tablet 10 mg         -- Oral Every 4 hours PRN 09/09/23 0626    09/09/23 0626  LORazepam injection 2 mg         -- IM Every 6 hours PRN 09/09/23 0626    09/09/23 0626  LORazepam tablet 2 mg         -- Oral Every 6 hours PRN 09/09/23 0626    09/09/23 0626  traZODone tablet 100 mg         -- Oral Nightly PRN 09/09/23 0626            Allergies:   Review of patient's allergies indicates:   Allergen Reactions    Cyclobenzaprine Other (See Comments)        OBJECTIVE:   Vitals   Vitals:    09/14/23 " "0701   BP: (!) 141/89   Pulse: 86   Resp: 18   Temp: 98.6 °F (37 °C)        Labs/Imaging/Studies:   No results found for this or any previous visit (from the past 36 hour(s)).         Medical Review Of Systems:  Constitutional: negative  Respiratory: negative  Cardiovascular: negative  Gastrointestinal: negative  Genitourinary:negative  Musculoskeletal:negative  Neurological: negative      Psychiatric Mental Status Exam:  General Appearance: appears stated age, well-developed, well-nourished  Arousal: alert  Behavior: cooperative  Movements and Motor Activity: no abnormal involuntary movements noted  Orientation: oriented to person, place, time, and situation  Speech: normal rate, normal rhythm, normal volume, normal tone  Mood: "All right"  Affect: constricted  Thought Process: linear  Associations: intact  Thought Content and Perceptions: questionable hallucinations, no suicidal ideation, no homicidal ideation, no paranoid ideation, no ideas of reference  Recent and Remote Memory: recent memory intact, remote memory intact; per interview/observation with patient  Attention and Concentration: intact, attentive to conversation; per interview/observation with patient  Fund of Knowledge: intact, aware of current events, vocabulary appropriate; based on history, vocabulary, fund of knowledge, syntax, grammar, and content  Insight: questionable; based on understanding of severity of illness and HPI  Judgment: questionable; based on patient's behavior and HPI      ASSESSMENT/PLAN:   Problems Addressed/Diagnoses:  Unspecified Mood Disorder (F39)  Anxiety (F41.9)  Opioid use disorder, in remission (F11.21)  R/o Amphetamine use disorder      UTI    No past medical history on file.     Plan:  Mood disorder  -Continue Zoloft 50mg daily  -Abilify 5mg daily    Anxiety  -Continue Zoloft 50mg daily  -Ok to continue Buspar    Opioid use, in remission  -Group/Individual psychotherapy      Expected Disposition Plan: " Home        Sancho Newman, [facility]

## 2023-09-14 NOTE — NURSING
Daily Nursing Note:        Behavior:     Patient (Elsa Chery is a 43 y.o. female, : 1980, MRN: [id number]) demonstrating an affect that was flat and anxious. Elsa demonstrating mood that is anxious. Elsa had an appearance that was clean. Elsa denies suicidal ideation. Elsa denies suicide plan. Elsa denies homicidal ideation. Elsa denies hallucinations.                Intervention:     Encourage Elsa to perform self-hygiene, grooming, and changing of clothing. Monitor Elsa's behavior and program compliance. Monitor Elsa for suicidal ideation, homicidal ideation, sleep disturbance, and hallucinations. Encourage Elsa to eat all portions of meals and assess for meal preferences. Monitor Elsa for intake and output to ensure hydration. Notify the Physician/Physician Assistant/Advance Practice Registered Nurse (MD/PA/APRN) for any medication refusal and any change in patient condition.        Response:     Elsa verbalizes understand of unit process and procedures. Elsa reported medications ______.        Plan:      Continue to monitor per MD/PA/APRN orders; maintain patient safety.

## 2023-09-14 NOTE — GROUP NOTE
Group Psychotherapy       Group Focus: Meds      Number of patients in attendance: 15    Group Start Time: 2030  Group End Time:  2100  Groups Date: 9/13/2023  Group Topic:  Behavioral Health  Group Department: [facility] - Behavioral Health Unit  Group Facilitators:  Alexandra Morales RN  _____________________________________________________________________    Patient Name: Elsa Chery  MRN: [id number]  Patient Class: IP- Psych   Admission Date\Time: 9/9/2023 12:20 AM  Hospital Length of Stay: 5  Primary Care Provider: Agustin Anne III, APRN-CNP     Referred by: Acute Psychiatry Unit Treatment Team     Target symptoms: Psychosis     Patient's response to treatment: Active Listening     Progress toward goals: Progressing adequately     Interval History:      Diagnosis: Psychosis     Plan: Continue treatment on APU

## 2023-09-15 PROCEDURE — 25000003 PHARM REV CODE 250: Performed by: NURSE PRACTITIONER

## 2023-09-15 PROCEDURE — 25000003 PHARM REV CODE 250: Performed by: PSYCHIATRY & NEUROLOGY

## 2023-09-15 PROCEDURE — 25000003 PHARM REV CODE 250: Performed by: PEDIATRICS

## 2023-09-15 RX ORDER — NITROFURANTOIN 25; 75 MG/1; MG/1
100 CAPSULE ORAL EVERY 12 HOURS
Qty: 3 CAPSULE | Refills: 0 | Status: SHIPPED | OUTPATIENT
Start: 2023-09-15 | End: 2023-09-17

## 2023-09-15 RX ORDER — ARIPIPRAZOLE 5 MG/1
5 TABLET ORAL DAILY
Qty: 30 TABLET | Refills: 0 | Status: SHIPPED | OUTPATIENT
Start: 2023-09-15 | End: 2023-10-15

## 2023-09-15 RX ORDER — BUSPIRONE HYDROCHLORIDE 10 MG/1
10 TABLET ORAL 2 TIMES DAILY
Qty: 60 TABLET | Refills: 0 | Status: SHIPPED | OUTPATIENT
Start: 2023-09-15 | End: 2023-10-15

## 2023-09-15 RX ORDER — LEVOTHYROXINE SODIUM 50 UG/1
50 TABLET ORAL DAILY
Qty: 30 TABLET | Refills: 0 | Status: SHIPPED | OUTPATIENT
Start: 2023-09-15 | End: 2023-10-15

## 2023-09-15 RX ADMIN — LEVOTHYROXINE SODIUM 50 MCG: 50 TABLET ORAL at 08:09

## 2023-09-15 RX ADMIN — NITROFURANTOIN MONOHYDRATE/MACROCRYSTALS 100 MG: 25; 75 CAPSULE ORAL at 08:09

## 2023-09-15 RX ADMIN — SERTRALINE HYDROCHLORIDE 50 MG: 50 TABLET ORAL at 08:09

## 2023-09-15 RX ADMIN — BUSPIRONE HYDROCHLORIDE 10 MG: 5 TABLET ORAL at 08:09

## 2023-09-15 RX ADMIN — ARIPIPRAZOLE 5 MG: 5 TABLET ORAL at 08:09

## 2023-09-15 NOTE — NURSING
Discharge Note:    Elsa Chery is a 43 y.o. female, : 1980, MRN: [id number], admitted on 2023 for Royal Cohen MD with a diagnosis of Psychosis [F29].    Patient discharged on 9/15/2023 per physician orders in stable condition. Patient denied suicidal ideation, homicidal ideation, or hallucinations. Patient was discharged with valuables, personal belongings, prescriptions, discharge instructions, and an educational handout explaining the diagnosis and prescribed medications. Patient verbalized understanding of the discharge instructions and importance of follow-up visits. Patient was escorted out of the facility by [facility] and placed into a secure transport vehicle to be transported to home.     Patient discharged on the following medications:     Medication List        START taking these medications      ARIPiprazole 5 MG Tab  Commonly known as: ABILIFY  Take 1 tablet (5 mg total) by mouth once daily.     busPIRone 10 MG tablet  Commonly known as: BUSPAR  Take 1 tablet (10 mg total) by mouth 2 (two) times daily.     nitrofurantoin (macrocrystal-monohydrate) 100 MG capsule  Commonly known as: MACROBID  Take 1 capsule (100 mg total) by mouth every 12 (twelve) hours. for 3 doses     sertraline 50 MG tablet  Commonly known as: ZOLOFT  Take 1 tablet (50 mg total) by mouth once daily.            CONTINUE taking these medications      levothyroxine 50 MCG tablet  Commonly known as: SYNTHROID  Take 1 tablet (50 mcg total) by mouth once daily.               Where to Get Your Medications        You can get these medications from any pharmacy    Bring a paper prescription for each of these medications  ARIPiprazole 5 MG Tab  busPIRone 10 MG tablet  levothyroxine 50 MCG tablet  nitrofurantoin (macrocrystal-monohydrate) 100 MG capsule  sertraline 50 MG tablet

## 2023-09-15 NOTE — GROUP NOTE
Group Psychotherapy       Group Focus: Medication      Number of patients in attendance: 15    Group Start Time: 2030  Group End Time:  2100  Groups Date: 9/14/2023  Group Topic:  Behavioral Health  Group Department: [facility] - Behavioral Health Unit  Group Facilitators:  Susanne Weaver RN  _____________________________________________________________________    Patient Name: Elsa Chery  MRN: [id number]  Patient Class: IP- Psych   Admission Date\Time: 9/9/2023 12:20 AM  Hospital Length of Stay: 5  Primary Care Provider: Agustin Anne III, APRN-CNP     Referred by: Acute Psychiatry Unit Treatment Team     Target symptoms: Substance Abuse and Psychosis     Patient's response to treatment: Active Listening     Progress toward goals: Progressing adequately     Interval History:      Diagnosis: Psychosis     Plan: Continue treatment on APU

## 2023-09-15 NOTE — PLAN OF CARE
Cony refused to ID at treatment plan but met unstated goal of improved social skills.   CTRS Discharge Recommendations:  Encouraged Pt. to actively utilize available community resources to increase leisure involvement to decrease signs and symptoms of illness.  Encouraged Pt. to utilize coping skills on a regular basis to reduce the risk of decomposition and re-hospitalization.

## 2023-09-15 NOTE — GROUP NOTE
Group Psychotherapy       Group Focus: Promoting Healthy Lifestyles   Group Topic: Anger Management: Therapist assisted with understanding of treatment plan and identification of anger symptoms. Pt explored anger triggers and coping mechanisms.     Number of patients in attendance: 9    Group Start Time: 1045  Group End Time:  1130  Groups Date: 9/14/2023  Group Topic:  Behavioral Health  Group Department: [facility] Behavioral Health Unit  Group Facilitators:  Jessica Manuel MSW  _____________________________________________________________________    Patient Name: Elsa Chery  MRN: [id number]  Patient Class: IP- Psych   Admission Date\Time: 9/9/2023 12:20 AM  Hospital Length of Stay: 6  Primary Care Provider: Agustin Anne III, APRN-CNP     Referred by: Acute Psychiatry Unit Treatment Team     Target symptoms: Poor Coping Skills     Patient's response to treatment: Active Listening and Self-disclosure     Progress toward goals: Progressing adequately     Interval History:      Diagnosis:      Plan: Continue treatment on APU

## 2023-09-15 NOTE — PROGRESS NOTES
"9/15/2023  Elsa Chery   1980   [id number]        Psychiatry Progress Note     Chief Complaint: "I'm all right"    SUBJECTIVE:   Elsa Chery is a 43 y.o. female placed under a PEC at [facility] after presenting on an OPC for psychotic behavior.    Patient states today that she is feeling much better. She attended groups yesterday. Denies SI/HI and AVH.  Tolerating current medication regimen without issues.  Denies any SI/HI or AVH. Patient was calm and cooperative during exam. Will continue current POC and proceed with discharge today.     UDS: (+)amphetamine  Blood alcohol: <10       Current Medications:   Scheduled Meds:    ARIPiprazole  5 mg Oral Daily    busPIRone  10 mg Oral BID    levothyroxine  50 mcg Oral Daily    nitrofurantoin (macrocrystal-monohydrate)  100 mg Oral Q12H    sertraline  50 mg Oral Daily      PRN Meds: diphenhydrAMINE, diphenhydrAMINE, haloperidol lactate, haloperidoL, hydrOXYzine HCL, lorazepam, LORazepam, magnesium hydroxide 400 mg/5 ml, traZODone   Psychotherapeutics (From admission, onward)      Start     Stop Route Frequency Ordered    09/13/23 1130  ARIPiprazole tablet 5 mg         -- Oral Daily 09/13/23 1025    09/11/23 1130  sertraline tablet 50 mg         -- Oral Daily 09/11/23 1119    09/09/23 2100  busPIRone tablet 10 mg         -- Oral 2 times daily 09/09/23 1512    09/09/23 0626  haloperidol lactate injection 10 mg         -- IM Every 6 hours PRN 09/09/23 0626    09/09/23 0626  haloperidoL tablet 10 mg         -- Oral Every 4 hours PRN 09/09/23 0626    09/09/23 0626  LORazepam injection 2 mg         -- IM Every 6 hours PRN 09/09/23 0626    09/09/23 0626  LORazepam tablet 2 mg         -- Oral Every 6 hours PRN 09/09/23 0626    09/09/23 0626  traZODone tablet 100 mg         -- Oral Nightly PRN 09/09/23 0626            Allergies:   Review of patient's allergies indicates:   Allergen Reactions    Cyclobenzaprine Other (See Comments)        OBJECTIVE:   Vitals   Vitals:    09/14/23 " "0701   BP: (!) 141/89   Pulse: 86   Resp: 18   Temp: 98.6 °F (37 °C)        Labs/Imaging/Studies:   No results found for this or any previous visit (from the past 36 hour(s)).         Medical Review Of Systems:  Constitutional: negative  Respiratory: negative  Cardiovascular: negative  Gastrointestinal: negative  Genitourinary:negative  Musculoskeletal:negative  Neurological: negative      Psychiatric Mental Status Exam:  General Appearance: appears stated age, well-developed, well-nourished  Arousal: alert  Behavior: cooperative  Movements and Motor Activity: no abnormal involuntary movements noted  Orientation: oriented to person, place, time, and situation  Speech: normal rate, normal rhythm, normal volume, normal tone  Mood: "All right"  Affect: constricted  Thought Process: linear  Associations: intact  Thought Content and Perceptions: Denies hallucinations, no suicidal ideation, no homicidal ideation, no paranoid ideation, no ideas of reference  Recent and Remote Memory: recent memory intact, remote memory intact; per interview/observation with patient  Attention and Concentration: intact, attentive to conversation; per interview/observation with patient  Fund of Knowledge: intact, aware of current events, vocabulary appropriate; based on history, vocabulary, fund of knowledge, syntax, grammar, and content  Insight: questionable; based on understanding of severity of illness and HPI  Judgment: questionable; based on patient's behavior and HPI      ASSESSMENT/PLAN:   Problems Addressed/Diagnoses:  Unspecified Mood Disorder (F39)  Anxiety (F41.9)  Opioid use disorder, in remission (F11.21)  R/o Amphetamine use disorder      UTI    No past medical history on file.     Plan:  Mood disorder  -Continue Zoloft 50mg daily  -Abilify 5mg daily    Anxiety  -Continue Zoloft 50mg daily  -Ok to continue Buspar    Opioid use, in remission  -Group/Individual psychotherapy      Expected Disposition Plan: Home        Sancho " Green, PMHNP-BC

## 2023-09-15 NOTE — NURSING
"Daily Nursing Note:      Behavior:    Patient (Elsa Chery is a 43 y.o. female, : 1980, MRN: [id number]) demonstrating an affect that was flat. Elsa demonstrating mood that is anxious. Elsa had an appearance that was disheveled. Elsa denies suicidal ideation. Elsa denies suicide plan. Elsa denies homicidal ideation. Elsa endorses auditory hallucinations.    Sonias  height is 5' 6" (1.676 m) and weight is 63.9 kg (140 lb 12.8 oz). Her oral temperature is 98.6 °F (37 °C). Her blood pressure is 141/89 (abnormal) and her pulse is 86. Her respiration is 18 and oxygen saturation is 97%.     Sonias last BM was noted on: _2023______      Intervention:    Encourage Elsa to perform self-hygiene, grooming, and changing of clothing. Monitor Elsa's behavior and program compliance. Monitor Elsa for suicidal ideation, homicidal ideation, sleep disturbance, and hallucinations. Encourage Elsa to eat all portions of meals and assess for meal preferences. Monitor Elsa for intake and output to ensure hydration. Notify the Physician/Physician Assistant/Advance Practice Registered Nurse (MD/PA/APRN) for any medication refusal and any change in patient condition.      Response:    Elsa verbalizes understand of unit process and procedures. Elsa reported medications Effectiveness with decreasing anxiety, depression and psychosis. ______.      Plan:     Continue to monitor per MD/PA/APRN orders; maintain patient safety.    "

## 2023-10-02 NOTE — DISCHARGE SUMMARY
"DISCHARGE SUMMARY  PSYCHIATRY      Admit Date: 9/9/2023 12:20 AM    Discharge Date:  9/15/2023    SITE:   [facility]    Discharge Attending Physician: Royal Cohen M.D.    Chief Complaint:  "I'm ready to go"    History of Present Illness On Admit:   Elsa Chery is a 43 y.o. female with past psychiatric history of opiate use disorder per her hx but records indicate hx of schizophrenia, currently admitted with depression.  Psychiatry has been consulted to address mood.     Ms. Chery reports that she does not understand why she is here.  She denies depression and she denies anger.  She denies an elevated mood.  She denies AVH and delusional thinking currently.  She says that the only thing that she had been experiencing was that she had been thinking about family members and worrying about them.  She reports that she just finds herself thinking about them and worrying about them but "nothing has really been going one."  She adamantly denies AVH and delusional thinking.  She denies weird behaviors.  She reports that she has been performing her ADLs without any issues.  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  She says that she does not have her own transportation and has to rely on others to get her to work.  She denies SI or HI.  She reports a good appetite and states that she has been sleeping well.      Admit Mental Status Exam:  General Appearance: unremarkable, dressed in hospital garb  Arousal: drowsy  Behavior: cooperative, polite, guarded/minimizing what she has been experiencing  Movements and Motor Activity: no abnormal involuntary movements noted; no tics, no tremors, no akathisia, no dystonia, no evidence of tardive dyskinesia; no psychomotor agitation or retardation  Orientation: grossly intact  Speech: normal rate, rhythm, volume, tone and pitch  Mood: Anxious and Guarded  Affect: " blunted  Thought Process: bizarre, +thought blocking  Associations: intact, no loosening of associations  Thought Content and Perceptions: no suicidal ideation, no homicidal ideation, no hallucinations, + suspicious  Recent and Remote Memory: no significant impairments noted  Attention and Concentration: intact  Fund of Knowledge: intact  Insight: impaired  Judgment: impaired      Diagnoses:  PRINCIPAL PROBLEM:  Moderate mood disorder      PROBLEM LIST    Moderate mood disorder    Anxiety disorder    Opioid dependence in remission        Hospital Course:   Patient was admitted to [facility] and started on Buspar.    9/11/23  This morning, she states that she does not have a current psychiatrist, but does admit that she has gone to [facility] in the past.  Her records reports that she sees Dr. Bolton and is on Subutex and Adderall.  Denies hallucinations today.  ED staff had spoken to patient's daughter who reported that patient had been paranoid and hallucinating and thought people were out to kill her.  No current objective symptoms of psychosis.  Will start on anxiety medication today and will monitor progress.     UDS: (+)amphetamine  Blood alcohol: <10      9/12/23  Today patient states that she is feeling better. She states her anxiety is improved. Zoloft was started today and no issues noted with this. She states that she is eating and sleeping well. No overt behavioral issues reported. She was calm and cooperative during this exam. Will continue with current POC and monitor for mood improvement.       9/13/23  Not attending groups.  Spends nearly all of her time in her room.  Patient states that she spends her time in her room because she is cold.  States that she last had auditory hallucinations over a year ago.  She did ask about taking a shower today.  Tolerating current medication regimen without issues.  Will add Abilify to her regimen and will monitor progress.      9/14/23  Patient states today  "that she is feeling better. She attending groups yesterday. Denies SI/HI and AVH.  Tolerating current medication regimen without issues.  States that she is sleeping well but she does not have much of an appetite. Will continue current POC and plan for discharge tomorrow.     9/15/23  Patient states today that she is feeling much better. She attended groups yesterday. Denies SI/HI and AVH.  Tolerating current medication regimen without issues.  Denies any SI/HI or AVH. Patient was calm and cooperative during exam. Will continue current POC and proceed with discharge today.         Current Medications:   Scheduled Meds:        DISCHARGE EXAMINATION    VITALS   Vitals:    09/12/23 1600 09/12/23 1900 09/13/23 0701 09/14/23 0701   BP: (!) 146/85 119/75 (!) 134/109 (!) 141/89   BP Location:  Left arm  Left arm   Patient Position:    Sitting   Pulse: 91 97 106 86   Resp: 18 18 16 18   Temp: 98.9 °F (37.2 °C) 98.8 °F (37.1 °C) 98.7 °F (37.1 °C) 98.6 °F (37 °C)   TempSrc:  Oral  Oral   SpO2: 97% 97% 96% 97%   Weight:       Height:             Discharge Mental Status Exam:  General Appearance: appears stated age, well-developed, well-nourished  Arousal: alert  Behavior: cooperative  Movements and Motor Activity: no abnormal involuntary movements noted  Orientation: oriented to person, place, time, and situation  Speech: normal rate, normal rhythm, normal volume, normal tone  Mood: "All right"  Affect: constricted  Thought Process: linear  Associations: intact  Thought Content and Perceptions: Denies hallucinations, no suicidal ideation, no homicidal ideation, no paranoid ideation, no ideas of reference  Recent and Remote Memory: recent memory intact, remote memory intact; per interview/observation with patient  Attention and Concentration: intact, attentive to conversation; per interview/observation with patient  Fund of Knowledge: intact, aware of current events, vocabulary appropriate; based on history, vocabulary, fund of " knowledge, syntax, grammar, and content  Insight: questionable; based on understanding of severity of illness and HPI  Judgment: questionable; based on patient's behavior and HPI      Discharge Condition:  Stable    Prognosis:  Fair    Justification for >1 antipsychotic:  N/a    Disposition:  discharged to home    Follow-up:     Follow-up Information       Agustin Anne III, APRN-CNP Follow up.    Specialty: Family Medicine  Contact information:  [address] [facility] [address]  [phone]               [facility] Follow up.    Specialties: Behavioral Health, Psychiatry, Psychology  Why: 9.20.2023 @11a  Bring insurance card and id  Contact information:  [address] [facility] [address]  [phone]                             Medication Regimen:  No current facility-administered medications for this encounter.    Current Outpatient Medications:     ARIPiprazole (ABILIFY) 5 MG Tab, Take 1 tablet (5 mg total) by mouth once daily., Disp: 30 tablet, Rfl: 0    busPIRone (BUSPAR) 10 MG tablet, Take 1 tablet (10 mg total) by mouth 2 (two) times daily., Disp: 60 tablet, Rfl: 0    levothyroxine (SYNTHROID) 50 MCG tablet, Take 1 tablet (50 mcg total) by mouth once daily., Disp: 30 tablet, Rfl: 0    sertraline (ZOLOFT) 50 MG tablet, Take 1 tablet (50 mg total) by mouth once daily., Disp: 30 tablet, Rfl: 0      Patient Instructions:   Continue medication regimen as prescribed.    Disposition plan per  - see  notes for details.    Patient instructed to call 911 or present to emergency department if any of the following complications develop status post discharge: suicidality, homicidality, or grave disability.     Total time spent discharging patient: <30 minutes      Royal Cohen M.D.